# Patient Record
Sex: FEMALE | Race: WHITE | NOT HISPANIC OR LATINO | Employment: FULL TIME | URBAN - METROPOLITAN AREA
[De-identification: names, ages, dates, MRNs, and addresses within clinical notes are randomized per-mention and may not be internally consistent; named-entity substitution may affect disease eponyms.]

---

## 2017-02-04 ENCOUNTER — HOSPITAL ENCOUNTER (OUTPATIENT)
Facility: MEDICAL CENTER | Age: 47
End: 2017-02-06
Attending: SPECIALIST | Admitting: SPECIALIST
Payer: COMMERCIAL

## 2017-02-04 PROBLEM — N92.0 HEAVY PERIODS: Status: ACTIVE | Noted: 2017-02-04

## 2017-02-04 LAB
HCG UR QL: NEGATIVE
SP GR UR REFRACTOMETRY: 1.02

## 2017-02-04 PROCEDURE — 160029 HCHG SURGERY MINUTES - 1ST 30 MINS LEVEL 4: Performed by: SPECIALIST

## 2017-02-04 PROCEDURE — 500448 HCHG DRESSING, TELFA 3X4: Performed by: SPECIALIST

## 2017-02-04 PROCEDURE — 160035 HCHG PACU - 1ST 60 MINS PHASE I: Performed by: SPECIALIST

## 2017-02-04 PROCEDURE — 81025 URINE PREGNANCY TEST: CPT

## 2017-02-04 PROCEDURE — 501838 HCHG SUTURE GENERAL: Performed by: SPECIALIST

## 2017-02-04 PROCEDURE — 90686 IIV4 VACC NO PRSV 0.5 ML IM: CPT | Performed by: SPECIALIST

## 2017-02-04 PROCEDURE — A9270 NON-COVERED ITEM OR SERVICE: HCPCS | Performed by: SPECIALIST

## 2017-02-04 PROCEDURE — 160041 HCHG SURGERY MINUTES - EA ADDL 1 MIN LEVEL 4: Performed by: SPECIALIST

## 2017-02-04 PROCEDURE — 88305 TISSUE EXAM BY PATHOLOGIST: CPT

## 2017-02-04 PROCEDURE — A9270 NON-COVERED ITEM OR SERVICE: HCPCS

## 2017-02-04 PROCEDURE — 501666 HCHG TUBING, HYDRO THERMABLATOR: Performed by: SPECIALIST

## 2017-02-04 PROCEDURE — 160047 HCHG PACU  - EA ADDL 30 MINS PHASE II: Performed by: SPECIALIST

## 2017-02-04 PROCEDURE — 700102 HCHG RX REV CODE 250 W/ 637 OVERRIDE(OP)

## 2017-02-04 PROCEDURE — 500766 HCHG KIT, D & E COLLECTION: Performed by: SPECIALIST

## 2017-02-04 PROCEDURE — 700111 HCHG RX REV CODE 636 W/ 250 OVERRIDE (IP)

## 2017-02-04 PROCEDURE — 160002 HCHG RECOVERY MINUTES (STAT): Performed by: SPECIALIST

## 2017-02-04 PROCEDURE — 501667 HCHG TUBING, HYSTEROSCOPY: Performed by: SPECIALIST

## 2017-02-04 PROCEDURE — 501660 HCHG TUBING, D & E COLLECTION SET: Performed by: SPECIALIST

## 2017-02-04 PROCEDURE — 700101 HCHG RX REV CODE 250

## 2017-02-04 PROCEDURE — 502587 HCHG PACK, D&C: Performed by: SPECIALIST

## 2017-02-04 PROCEDURE — 700111 HCHG RX REV CODE 636 W/ 250 OVERRIDE (IP): Performed by: SPECIALIST

## 2017-02-04 PROCEDURE — 160046 HCHG PACU - 1ST 60 MINS PHASE II: Performed by: SPECIALIST

## 2017-02-04 PROCEDURE — 160025 RECOVERY II MINUTES (STATS): Performed by: SPECIALIST

## 2017-02-04 PROCEDURE — 700102 HCHG RX REV CODE 250 W/ 637 OVERRIDE(OP): Performed by: SPECIALIST

## 2017-02-04 PROCEDURE — 160048 HCHG OR STATISTICAL LEVEL 1-5: Performed by: SPECIALIST

## 2017-02-04 PROCEDURE — 502240 HCHG MISC OR SUPPLY RC 0272: Performed by: SPECIALIST

## 2017-02-04 PROCEDURE — 110382 HCHG SHELL REV 271: Performed by: SPECIALIST

## 2017-02-04 PROCEDURE — G0378 HOSPITAL OBSERVATION PER HR: HCPCS

## 2017-02-04 PROCEDURE — 160036 HCHG PACU - EA ADDL 30 MINS PHASE I: Performed by: SPECIALIST

## 2017-02-04 PROCEDURE — A4606 OXYGEN PROBE USED W OXIMETER: HCPCS | Performed by: SPECIALIST

## 2017-02-04 PROCEDURE — 90471 IMMUNIZATION ADMIN: CPT

## 2017-02-04 PROCEDURE — 160009 HCHG ANES TIME/MIN: Performed by: SPECIALIST

## 2017-02-04 RX ORDER — ONDANSETRON 2 MG/ML
INJECTION INTRAMUSCULAR; INTRAVENOUS
Status: COMPLETED
Start: 2017-02-04 | End: 2017-02-04

## 2017-02-04 RX ORDER — IBUPROFEN 800 MG/1
800 TABLET ORAL EVERY 6 HOURS PRN
Qty: 30 TAB | Refills: 0 | Status: SHIPPED | OUTPATIENT
Start: 2017-02-04 | End: 2017-02-27

## 2017-02-04 RX ORDER — MIDAZOLAM HYDROCHLORIDE 1 MG/ML
INJECTION INTRAMUSCULAR; INTRAVENOUS
Status: COMPLETED
Start: 2017-02-04 | End: 2017-02-04

## 2017-02-04 RX ORDER — OXYCODONE HCL 5 MG/5 ML
SOLUTION, ORAL ORAL
Status: COMPLETED
Start: 2017-02-04 | End: 2017-02-04

## 2017-02-04 RX ORDER — ONDANSETRON 2 MG/ML
4 INJECTION INTRAMUSCULAR; INTRAVENOUS EVERY 6 HOURS PRN
Status: DISCONTINUED | OUTPATIENT
Start: 2017-02-04 | End: 2017-02-06 | Stop reason: HOSPADM

## 2017-02-04 RX ORDER — OXYCODONE HYDROCHLORIDE AND ACETAMINOPHEN 5; 325 MG/1; MG/1
1-2 TABLET ORAL EVERY 4 HOURS PRN
Qty: 30 TAB | Refills: 0 | Status: SHIPPED | OUTPATIENT
Start: 2017-02-04 | End: 2019-09-14

## 2017-02-04 RX ORDER — OXYCODONE HYDROCHLORIDE AND ACETAMINOPHEN 5; 325 MG/1; MG/1
1-2 TABLET ORAL EVERY 4 HOURS PRN
Status: DISCONTINUED | OUTPATIENT
Start: 2017-02-04 | End: 2017-02-04

## 2017-02-04 RX ORDER — SODIUM CHLORIDE, SODIUM LACTATE, POTASSIUM CHLORIDE, CALCIUM CHLORIDE 600; 310; 30; 20 MG/100ML; MG/100ML; MG/100ML; MG/100ML
1000 INJECTION, SOLUTION INTRAVENOUS
Status: COMPLETED | OUTPATIENT
Start: 2017-02-04 | End: 2017-02-04

## 2017-02-04 RX ORDER — IBUPROFEN 800 MG/1
800 TABLET ORAL EVERY 6 HOURS PRN
Status: DISCONTINUED | OUTPATIENT
Start: 2017-02-04 | End: 2017-02-06 | Stop reason: HOSPADM

## 2017-02-04 RX ADMIN — FENTANYL CITRATE 25 MCG: 50 INJECTION, SOLUTION INTRAMUSCULAR; INTRAVENOUS at 16:50

## 2017-02-04 RX ADMIN — INFLUENZA A VIRUS A/CALIFORNIA/7/2009 X-179A (H1N1) ANTIGEN (FORMALDEHYDE INACTIVATED), INFLUENZA A VIRUS A/HONG KONG/4801/2014 X-263B (H3N2) ANTIGEN (FORMALDEHYDE INACTIVATED), INFLUENZA B VIRUS B/PHUKET/3073/2013 ANTIGEN (FORMALDEHYDE INACTIVATED), AND INFLUENZA B VIRUS B/BRISBANE/60/2008 ANTIGEN (FORMALDEHYDE INACTIVATED) 0.5 ML: 15; 15; 15; 15 INJECTION, SUSPENSION INTRAMUSCULAR at 20:06

## 2017-02-04 RX ADMIN — FENTANYL CITRATE 25 MCG: 50 INJECTION, SOLUTION INTRAMUSCULAR; INTRAVENOUS at 14:45

## 2017-02-04 RX ADMIN — IBUPROFEN 800 MG: 200 TABLET, FILM COATED ORAL at 15:08

## 2017-02-04 RX ADMIN — MORPHINE SULFATE: 50 INJECTION, SOLUTION, CONCENTRATE INTRAVENOUS at 18:15

## 2017-02-04 RX ADMIN — MIDAZOLAM 1 MG: 1 INJECTION INTRAMUSCULAR; INTRAVENOUS at 15:15

## 2017-02-04 RX ADMIN — MIDAZOLAM 1 MG: 1 INJECTION INTRAMUSCULAR; INTRAVENOUS at 14:20

## 2017-02-04 RX ADMIN — OXYCODONE HYDROCHLORIDE 10 MG: 5 SOLUTION ORAL at 14:15

## 2017-02-04 RX ADMIN — ONDANSETRON 4 MG: 2 INJECTION, SOLUTION INTRAMUSCULAR; INTRAVENOUS at 16:35

## 2017-02-04 RX ADMIN — FENTANYL CITRATE 25 MCG: 50 INJECTION, SOLUTION INTRAMUSCULAR; INTRAVENOUS at 14:15

## 2017-02-04 RX ADMIN — SODIUM CHLORIDE, SODIUM LACTATE, POTASSIUM CHLORIDE, CALCIUM CHLORIDE 1000 ML: 600; 310; 30; 20 INJECTION, SOLUTION INTRAVENOUS at 10:38

## 2017-02-04 RX ADMIN — FENTANYL CITRATE 25 MCG: 50 INJECTION, SOLUTION INTRAMUSCULAR; INTRAVENOUS at 14:20

## 2017-02-04 ASSESSMENT — PAIN SCALES - GENERAL
PAINLEVEL_OUTOF10: 6
PAINLEVEL_OUTOF10: 5
PAINLEVEL_OUTOF10: 4
PAINLEVEL_OUTOF10: 7
PAINLEVEL_OUTOF10: 6
PAINLEVEL_OUTOF10: 6
PAINLEVEL_OUTOF10: 5
PAINLEVEL_OUTOF10: 7
PAINLEVEL_OUTOF10: 0
PAINLEVEL_OUTOF10: 5

## 2017-02-04 ASSESSMENT — LIFESTYLE VARIABLES
ALCOHOL_USE: NO
EVER_SMOKED: YES

## 2017-02-04 NOTE — H&P
DATE OF SURGERY:  Saturday, 02/04/2017.    IDENTIFICATION:  The patient is a very pleasant 46-year-old multipara (para 2,   with 2 previous vaginal deliveries).    CHIEF COMPLAINT:  Menorrhagia.    HISTORY OF PRESENT ILLNESS:  The patient is a very pleasant 46-year-old   multipara with 2 previous vaginal deliveries, who had a laparoscopic bilateral   tubal ligation at [facility] and she has had   menorrhagia.  She complains also of worsening dysmenorrhea.  She says that her   menses are regular with interval of about 28-29 days and that the amount of   blood flow that she experiences with her menses is very heavy and she says she   always has dysmenorrhea and that over the past year, the dysmenorrhea has   become severe.  She smokes about 1 pack of cigarettes per day.  She says that   during the first 3 days of her menses, her menses are characterized by   extremely heavy blood flow somewhat so that she cannot even leave her house   during the first 3 days of her menses and that she sometimes goes through a   super plus absorbent tampon as frequently as every 20 minutes.  She also has   dysmenorrhea.  She is scheduled to have a hysteroscopy, D and C, and   hydrothermal endometrial ablation.  I have discussed with her in detail at   length with hysteroscopy, D and C, and hydrothermal endometrial ablation is   and what hysteroscopy, D and C, and hydrothermal endometrial ablation involves   and I have discussed with her and explained to her the risks and benefits and   alternatives of hysteroscopy, D and C, and hydrothermal endometrial ablation.    After our discussions and after answering her questions, she said that she   very much wishes for us to proceed with hysteroscopy, D and C, and   hydrothermal endometrial ablation.  Of note, her Pap smear which was collected   on 10/27/2016 came back according to the report from [email or web address] as   satisfactory for evaluation and negative for any  intraepithelial lesion or   malignancy.    PAST MEDICAL HISTORY:  The patient says she has a history urolithiasis.    PAST SURGICAL HISTORY:  The patient has had laparoscopic bilateral tubal   ligation.  She had a left breast lumpectomy.  She had an adenoidectomy when   she was 7 years old.    MEDICATIONS:  The patient says she recently switched from Zoloft to   Trintellix.    ALLERGIES:  No known drug allergies.    SOCIAL HISTORY:  The patient says that she smokes about 1 pack of cigarettes   per day.  She says she drinks a few alcoholic beverages per day.  She denies   the use of recreational drugs.    REVIEW OF SYSTEMS:  GENERAL:  No fevers or chills or sweats.  PULMONARY:  No coughing or wheezing or chest pain or shortness of breath.  CARDIOVASCULAR:  No palpitations or dyspnea or chest pain.  GASTROINTESTINAL:  No nausea, vomiting, diarrhea, constipation.  GENITOURINARY:  The patient complains of menorrhagia and dysmenorrhea as   described above.  MUSCULOSKELETAL:  No arthralgias or myalgias.  NEUROLOGIC:  No headaches or syncope or seizures.    PHYSICAL EXAMINATION:  VITAL SIGNS:  Patient's vital signs are stable and she is afebrile.  GENERAL:  The patient appears well-developed and well-nourished and relaxed   and alert and comfortable and in no apparent distress.  HEENT:  Normocephalic, atraumatic.  Pupils equal, round, reactive to light and   accommodation.  Extraocular motions intact.  Pharynx clear.  NECK:  There is no thyromegaly.  There is no cervical lymphadenopathy.  CHEST AND HEART:  Regular rate and rhythm.  No murmur.  LUNGS:  Clear to auscultation bilaterally.  ABDOMEN:  Examination of the patient's abdomen with the patient in the dorsal   supine position reveals that the abdomen is soft and flat, and nontender and   nondistended and that there is no hepatomegaly and that there is no   splenomegaly.  SPECULUM EXAM:  With the patient in the dorsal lithotomy position, a speculum   exam is performed  and reveals that there are no vulvar, vaginal or cervical   lesions and that there is no cervical or vaginal discharge and the vulva and   vaginal mucosa appear to be well estrogenized.  BIMANUAL EXAM:  With the patient in the dorsal lithotomy position, a bimanual   vaginal exam was performed and bimanual vaginal exam revealed no evidence of   uterine enlargement and no evidence of tenderness to palpation of the uterine   corpus and no evidence of any adnexal masses or tenderness either on the right   or the left.  EXTREMITIES:  No clubbing, cyanosis or edema.  NEUROLOGICAL:  Nonfocal.    ASSESSMENT:  1.  Menorrhagia.  2.  Dysmenorrhea.  3.  The patient smokes.    PLAN:  We will proceed with hysteroscopy, D and C, and hydrothermal   endometrial ablation.  As noted, I have discussed with the patient in detail   at length what hysteroscopy, D and C, and hydrothermal endometrial ablation is   and what hysteroscopy, D and C, and hydrothermal endometrial ablation   involves and I have discussed with her the risks and benefits and alternatives   of hysteroscopy, D and C, and hydrothermal endometrial ablation and after our   discussions and after answering her questions, she said that she very much   wishes for us to proceed with hysteroscopy, D and C, and hydrothermal   endometrial ablation.       ____________________________________     DANNY MUNOZ MD    MED / NTS    DD:  02/03/2017 15:35:26  DT:  02/03/2017 18:53:25    D#:  [id number]  Job#:  [id number]

## 2017-02-04 NOTE — OR SURGEON
Immediate Post-Operative Note      PreOp Diagnosis:   1.  Menorrhagia.  2.  Dysmenorrhea.  3.  The patient smokes    PostOp Diagnosis:   1.  Menorrhagia.  2.  Dysmenorrhea.  3.  The patient smokes    Procedure(s):  HYSTEROSCOPY WITH VIDEO DIAGNOSTIC - Wound Class: Clean Contaminated  DILATION AND CURETTAGE - Wound Class: Clean Contaminated  HYSTEROSCOPY THERMAL ABLATION - Wound Class: Clean Contaminated    Surgeon(s):  Ghulam Kirkland M.D.    Anesthesiologist/Type of Anesthesia:  Anesthesiologist: Saul Kat M.D./General    Surgical Staff:  Circulator: Ap Pettit R.N.; Licha Bro R.N.  Scrub Person: Riley Fonseca    Specimen:   Endometrial curetting    Estimated Blood Loss:   Less than 30 cc's     Findings:   At hysteroscopy no evidence of any congenital uterine anomaly is seen and no evidence of submucosal fibroids is seen.   Some evidence of endometrial polyps is seen at hysteroscopy.   When hysteroscopy is continued following endometrial ablation the entire intrauterine cavity is found to be nicely and thoroughly ablated.     Complications:   None.         2/4/2017 2:09 PM Ghulam Kirkland

## 2017-02-04 NOTE — IP AVS SNAPSHOT
" Home Care Instructions                                                                                                                  Name:Werner Wadsworth  Medical Record Number:[id number]  CSN: [id number]    YOB: 1970   Age: 46 y.o.  Sex: female  HT:1.702 m (5' 7.01\") WT: 81.5 kg (179 lb 10.8 oz)          Admit Date: 2/4/2017     Discharge Date:   Today's Date: 2/6/2017  Attending Doctor:  Ghulam Kirkland M.D.                  Allergies:  Review of patient's allergies indicates no known allergies.            Discharge Instructions         ACTIVITY: Rest and take it easy for the first 24 hours.  A responsible adult is recommended to remain with you during that time.  It is normal to feel sleepy.  We encourage you to not do anything that requires balance, judgment or coordination.    MILD FLU-LIKE SYMPTOMS ARE NORMAL. YOU MAY EXPERIENCE GENERALIZED MUSCLE ACHES, THROAT IRRITATION, HEADACHE AND/OR SOME NAUSEA.    FOR 24 HOURS DO NOT:  Drive, operate machinery or run household appliances.  Drink beer or alcoholic beverages.   Make important decisions or sign legal documents.    SPECIAL INSTRUCTIONS:     DIET: To avoid nausea, slowly advance diet as tolerated, avoiding spicy or greasy foods for the first day.  Add more substantial food to your diet according to your physician's instructions.  Babies can be fed formula or breast milk as soon as they are hungry.  INCREASE FLUIDS AND FIBER TO AVOID CONSTIPATION.    SURGICAL DRESSING/BATHING:     FOLLOW-UP APPOINTMENT:  A follow-up appointment should be arranged with your doctor in 2 weeks; call to schedule.    You should CALL YOUR PHYSICIAN if you develop:  Fever greater than 101 degrees F.  Pain not relieved by medication, or persistent nausea or vomiting.  Excessive bleeding (blood soaking through dressing) or unexpected drainage from the wound.  Extreme redness or swelling around the incision site, drainage of pus or foul smelling " drainage.  Inability to urinate or empty your bladder within 8 hours.  Problems with breathing or chest pain.    You should call 911 if you develop problems with breathing or chest pain.  If you are unable to contact your doctor or surgical center, you should go to the nearest emergency room or urgent care center.  Physician's telephone #: Dr. Kirkland    If any questions arise, call your doctor.  If your doctor is not available, please feel free to call the Surgical Center at [phone].  The Center is open Monday through Friday from 7AM to 7PM.  You can also call the [employer] HOTLINE open 24 hours/day, 7 days/week and speak to a nurse at [phone], or toll free at [phone].    A registered nurse may call you a few days after your surgery to see how you are doing after your procedure.    MEDICATIONS: Resume taking daily medication.  Take prescribed pain medication with food.  If no medication is prescribed, you may take non-aspirin pain medication if needed.  PAIN MEDICATION CAN BE VERY CONSTIPATING.  Take a stool softener or laxative such as senokot, pericolace, or milk of magnesia if needed.    Prescription given for pain.  Last pain medication given at 1300.    If your physician has prescribed pain medication that includes Acetaminophen (Tylenol), do not take additional Acetaminophen (Tylenol) while taking the prescribed medication.    Discharge Instructions    Discharged to home by car with relative. Discharged via walking, hospital escort: Refused.  Special equipment needed: Not Applicable    Be sure to schedule a follow-up appointment with your primary care doctor or any specialists as instructed.     Discharge Plan:   Smoking Cessation Offered: Patient Refused  Influenza Vaccine Indication: Indicated: 9 to 64 years of age  Influenza Vaccine Given - only chart on this line when given: Influenza Vaccine Given (See MAR)    I understand that a diet low in cholesterol, fat, and sodium is recommended for  good health. Unless I have been given specific instructions below for another diet, I accept this instruction as my diet prescription.   Other diet: Regular    Special Instructions: None    · Is patient discharged on Warfarin / Coumadin?   No     · Is patient Post Blood Transfusion?  No    Depression / Suicide Risk    As you are discharged from this [facility], it is important to learn how to keep safe from harming yourself.    Recognize the warning signs:  · Abrupt changes in personality, positive or negative- including increase in energy   · Giving away possessions  · Change in eating patterns- significant weight changes-  positive or negative  · Change in sleeping patterns- unable to sleep or sleeping all the time   · Unwillingness or inability to communicate  · Depression  · Unusual sadness, discouragement and loneliness  · Talk of wanting to die  · Neglect of personal appearance   · Rebelliousness- reckless behavior  · Withdrawal from people/activities they love  · Confusion- inability to concentrate     If you or a loved one observes any of these behaviors or has concerns about self-harm, here's what you can do:  · Talk about it- your feelings and reasons for harming yourself  · Remove any means that you might use to hurt yourself (examples: pills, rope, extension cords, firearm)  · Get professional help from the community (Mental Health, Substance Abuse, psychological counseling)  · Do not be alone:Call your Safe Contact- someone whom you trust who will be there for you.  · Call your local CRISIS HOTLINE 081-7588 or 220-061-6026  · Call your local Children's Mobile Crisis Response Team [place] (526) 696-3532 or www.[employer]  · Call the toll free National Suicide Prevention Hotlines   · National Suicide Prevention Lifeline 030-470-CBKX (7335)  · National Hope Line Network 800-SUICIDE (689-2830)        Depression / Suicide Risk    As you are discharged from this [facility],  it is important to learn how to keep safe from harming yourself.    Recognize the warning signs:  · Abrupt changes in personality, positive or negative- including increase in energy   · Giving away possessions  · Change in eating patterns- significant weight changes-  positive or negative  · Change in sleeping patterns- unable to sleep or sleeping all the time   · Unwillingness or inability to communicate  · Depression  · Unusual sadness, discouragement and loneliness  · Talk of wanting to die  · Neglect of personal appearance   · Rebelliousness- reckless behavior  · Withdrawal from people/activities they love  · Confusion- inability to concentrate     If you or a loved one observes any of these behaviors or has concerns about self-harm, here's what you can do:  · Talk about it- your feelings and reasons for harming yourself  · Remove any means that you might use to hurt yourself (examples: pills, rope, extension cords, firearm)  · Get professional help from the community (Mental Health, Substance Abuse, psychological counseling)  · Do not be alone:Call your Safe Contact- someone whom you trust who will be there for you.  · Call your local CRISIS HOTLINE 613-4856 or 683-272-9956  · Call your local Children's Mobile Crisis Response Team [place] (689) 773-9654 or www.[employer]  · Call the toll free National Suicide Prevention Hotlines   · National Suicide Prevention Lifeline 890-342-TMXL (6704)  · National Hope Line Network 800-SUICIDE (346-0695)    Follow-up Information     1. Follow up with Ghulam Kirkland M.D.. Schedule an appointment as soon as possible for a visit in 2 weeks.    Specialty:  OB/Gyn    Why:  post op follow up    Contact information    [facility]  [place] [address]  [phone]           Discharge Medication Instructions:    Below are the medications your physician expects you to take upon discharge:    Review all your home medications and newly ordered medications with your doctor  and/or pharmacist. Follow medication instructions as directed by your doctor and/or pharmacist.    Please keep your medication list with you and share with your physician.               Medication List      START taking these medications        Instructions    ibuprofen 800 MG Tabs   Last time this was given:  800 mg on 2/4/2017  3:08 PM   Commonly known as:  MOTRIN    Take 1 Tab by mouth every 6 hours as needed.   Dose:  800 mg       oxycodone-acetaminophen 5-325 MG Tabs   Last time this was given:  2 Tabs on 2/6/2017 12:54 PM   Commonly known as:  PERCOCET    Take 1-2 Tabs by mouth every four hours as needed for Moderate Pain.   Dose:  1-2 Tab         CONTINUE taking these medications        Instructions    TRINTELLIX 10 MG Tabs   Generic drug:  Vortioxetine HBr    Take  by mouth every day at 6 PM.               Instructions           Diet / Nutrition:    Follow any diet instructions given to you by your doctor or the dietician, including how much salt (sodium) you are allowed each day.    If you are overweight, talk to your doctor about a weight reduction plan.    Activity:    Remain physically active following your doctor's instructions about exercise and activity.    Rest often.     Any time you become even a little tired or short of breath, SIT DOWN and rest.    Worsening Symptoms:    Report any of the following signs and symptoms to the doctor's office immediately:    *Pain of jaw, arm, or neck  *Chest pain not relieved by medication                               *Dizziness or loss of consciousness  *Difficulty breathing even when at rest   *More tired than usual                                       *Bleeding drainage or swelling of surgical site  *Swelling of feet, ankles, legs or stomach                 *Fever (>100ºF)  *Pink or blood tinged sputum  *Weight gain (3lbs/day or 5lbs /week)           *Shock from internal defibrillator (if applicable)  *Palpitations or irregular heartbeats                *Cool  and/or numb extremities    Stroke Awareness    Common Risk Factors for Stroke include:    Age  Atrial Fibrillation  Carotid Artery Stenosis  Diabetes Mellitus  Excessive alcohol consumption  High blood pressure  Overweight   Physical inactivity  Smoking    Warning signs and symptoms of a stroke include:    *Sudden numbness or weakness of the face, arm or leg (especially on one side of the body).  *Sudden confusion, trouble speaking or understanding.  *Sudden trouble seeing in one or both eyes.  *Sudden trouble walking, dizziness, loss of balance or coordination.Sudden severe headache with no known cause.    It is very important to get treatment quickly when a stroke occurs. If you experience any of the above warning signs, call 911 immediately.                   Disclaimer         Quit Smoking / Tobacco Use:    I understand the use of any tobacco products increases my chance of suffering from future heart disease or stroke and could cause other illnesses which may shorten my life. Quitting the use of tobacco products is the single most important thing I can do to improve my health. For further information on smoking / tobacco cessation call a Toll Free Quit Line at 1-808.146.9162 (*National Cancer [place]) or 1-755.865.4033 (American Lung Association) or you can access the web based program at www.lungAvolent.org.    [place] Tobacco Users Help Line:  (735) 169-8954       Toll Free: 1-893.418.6879  Quit Tobacco Program [facility] (228)012-7903    Crisis Hotline:    [place] Crisis Hotline:  9-986-VOGPVCT or 1-375.414.4429    [place] Crisis Hotline:    1-624.286.5180 or 691-646-4719    Discharge Survey:   Thank you for choosing [facility]. We hope we did everything we could to make your hospital stay a pleasant one. You may be receiving a phone survey and we would appreciate your time and participation in answering the questions. Your input is very valuable to us in our efforts to improve our  service to our patients and their families.        My signature on this form indicates that:    1. I have reviewed and understand the above information.  2. My questions regarding this information have been answered to my satisfaction.  3. I have formulated a plan with my discharge nurse to obtain my prescribed medications for home.                  Disclaimer         __________________________________                     __________       ________                       Patient Signature                                                 Date                    Time

## 2017-02-05 PROCEDURE — G0378 HOSPITAL OBSERVATION PER HR: HCPCS

## 2017-02-05 PROCEDURE — 96361 HYDRATE IV INFUSION ADD-ON: CPT

## 2017-02-05 PROCEDURE — 700111 HCHG RX REV CODE 636 W/ 250 OVERRIDE (IP): Performed by: SPECIALIST

## 2017-02-05 RX ADMIN — MORPHINE SULFATE 50 MG: 50 INJECTION, SOLUTION, CONCENTRATE INTRAVENOUS at 11:45

## 2017-02-05 RX ADMIN — ONDANSETRON 4 MG: 2 INJECTION, SOLUTION INTRAMUSCULAR; INTRAVENOUS at 02:13

## 2017-02-05 ASSESSMENT — PAIN SCALES - GENERAL
PAINLEVEL_OUTOF10: 2
PAINLEVEL_OUTOF10: 3
PAINLEVEL_OUTOF10: 4

## 2017-02-05 ASSESSMENT — LIFESTYLE VARIABLES: DO YOU DRINK ALCOHOL: NO

## 2017-02-05 NOTE — PROGRESS NOTES
Pt arrived to unit via gurney from main ER at 1849.  Pt oriented to unit, call light.  Assessment completed, pt reports 7/10 abdominal pain.  POC updated. Call light within reach.  Needs met, will continue to monitor

## 2017-02-05 NOTE — PROGRESS NOTES
Pt ambulated to restroom with steady gait.  Provided pt with towels, wash cloths, soap per pt's request.

## 2017-02-05 NOTE — OR NURSING
0608: Called and gave report to ALLI Bowers for T205   No questions at this time.    Robinson will call to let us know when the room is clean and available.

## 2017-02-05 NOTE — OP REPORT
DATE OF SERVICE:  02/04/2017    PREOPERATIVE DIAGNOSES:  Menorrhagia, dysmenorrhea, and the patient smokes.    POSTOPERATIVE DIAGNOSES:  Menorrhagia, dysmenorrhea, and the patient smokes.    PROCEDURES PERFORMED:  Hysteroscopy, D and C, and hysteroscopic hydrothermal   endometrial ablation.    SURGEON:  Ghulam Kirkland MD    ANESTHESIA:  General anesthesia.    ANESTHESIOLOGIST:  Saul Kat MD    FINDINGS:  Speculum exam under anesthesia reveals that there are no vulvar or   vaginal or cervical lesions.  The cervix is well visualized and is large and   multiparous.  At hysteroscopy, no evidence of any congenital uterine anomaly   is seen and no evidence of submucosal fibroids is seen.  Some evidence of   endometrial polyps is seen at hysteroscopy.  When hysteroscopy is continued   following hydrothermal endometrial ablation, it is noted at hysteroscopy the   entire intrauterine cavity is nicely and thoroughly ablated.    SPECIMENS:  Endometrial curettings.    COMPLICATIONS:  None.    ESTIMATED BLOOD LOSS:  Less than 30 mL.    DESCRIPTION OF PROCEDURE:  After appropriate consents have been obtained, the   patient was taken to the operating room and given general anesthesia.  She was   prepped and draped in the dorsal lithotomy position and a Chiang catheter was   noted to be in place and draining urine.  A speculum exam was performed that   reveals that there are no vulvar or vaginal or cervical lesions.  The cervix   is large and multiparous and well visualized.  The anterior aspect of the   cervix was grasped with a single tooth tenaculum.  The cervix was dilated with   Delia and then Anju dilators.  The hysteroscope was advanced through the   endocervical canal into the intrauterine cavity and hysteroscopy was performed   and pictures were taken.  No evidence of any congenital uterine anomaly is   seen and no evidence of submucosal fibroids is seen.  Some evidence of   endometrial polyps is seen.  The hysteroscope  was removed.  A sharp curet is   introduced into the intrauterine cavity and all 4 quadrants of the   intrauterine cavity were thoroughly curetted and curettings were submitted on   a Telfa pad.  The hysteroscope was then reinserted through the endocervical   canal into the intrauterine cavity.  Hysteroscopy is repeated.  Then,   hydrothermal endometrial ablation is performed.  After the usual 90-second   warmup, hydrothermal endometrial ablation was continued for 10 minutes.  Then,   after the following 90-second cool down, hysteroscopy is continued and   pictures were taken and the entire intrauterine cavity was found to be nicely   and thoroughly cauterized.  Pictures are taken.  The hysteroscope was removed.    The single tooth tenaculum was removed from the cervix.  The cervix was   examined and no significant bleeding was seen coming from the cervix, either   from the site of attachment of the single tooth tenaculum or from the external   cervical os.  The speculum was then removed.  The procedure was terminated.    The patient tolerated the procedure well and sent to postanesthesia recovery   in stable condition.       ____________________________________     DANNY MUNOZ MD    MED / NTS    DD:  02/04/2017 14:40:29  DT:  02/04/2017 19:05:31    D#:  [id number]  Job#:  [id number]    cc: PETEY ACOSTA MD

## 2017-02-05 NOTE — OR NURSING
Up to bathroom, voided.  C/O nausea and vomited 200cc clear fluid.  States she still feels nauseated.

## 2017-02-05 NOTE — PROGRESS NOTES
POST OP DAY # 1  The patient says that she still has some pelvic cramping pain. She says that this pain is less this morning compared to yesterday.   She also has had some nausea, but not today.  She says that she feels fine otherwise.   She says that she has been ambulating and urinating and tolerating oral fluids.   The patient's vital signs have been stable and she has been afebrile.   She appears well developed and well nourished and relaxed and alert and comfortable and in no apparent distress.   We will continue to have the patient ambulate and will continue to give analgesia (morphine pca).   Ghulam Kirkland M.D.

## 2017-02-05 NOTE — PROGRESS NOTES
Assumed care of pt, pt A&OX4, HOLDEN, no n/v, tender on abd, PCA running per MAR, confirmed w/ day shift RN, 1mg/15min/30mg/4h, pt utilized PCA for pain, ICE on abd for comfort, up to bathroom, slight bleeding present from vaginal area. steady on foot,  in placed, flu vaccines adm per protocol. Provided ice water, pt tolerated ok, no c/o n/v,  2x rails up, call light within reach, hourly rounding in use.

## 2017-02-05 NOTE — PROGRESS NOTES
Pt got up for bathroom, pt feels nauseating, adm antinausea. All needs are met, hourly rounding in use.

## 2017-02-05 NOTE — PROGRESS NOTES
Pt got up to bathroom, ambulated good, pt c/o n/v x2  after each ambulation, will update MD james.

## 2017-02-05 NOTE — PROGRESS NOTES
Assumed patient care. Report received from ALIL Hernandez.  Pt AAO.  Respirations even, unlabored on 2L O2.  Pt reports of 2/10 abdominal pain, PCA infusing per MAR.    Call light within reach.  Pt updated on POC, updated communication board.  Needs met, will continue to monitor.

## 2017-02-06 VITALS
HEIGHT: 67 IN | OXYGEN SATURATION: 96 % | HEART RATE: 76 BPM | DIASTOLIC BLOOD PRESSURE: 71 MMHG | TEMPERATURE: 98.3 F | SYSTOLIC BLOOD PRESSURE: 110 MMHG | RESPIRATION RATE: 16 BRPM | BODY MASS INDEX: 28.2 KG/M2 | WEIGHT: 179.68 LBS

## 2017-02-06 PROCEDURE — 302129 PCA PLUS: Performed by: SPECIALIST

## 2017-02-06 PROCEDURE — G0378 HOSPITAL OBSERVATION PER HR: HCPCS

## 2017-02-06 PROCEDURE — 700102 HCHG RX REV CODE 250 W/ 637 OVERRIDE(OP): Performed by: SPECIALIST

## 2017-02-06 PROCEDURE — 700111 HCHG RX REV CODE 636 W/ 250 OVERRIDE (IP): Performed by: SPECIALIST

## 2017-02-06 PROCEDURE — A9270 NON-COVERED ITEM OR SERVICE: HCPCS | Performed by: SPECIALIST

## 2017-02-06 PROCEDURE — 96361 HYDRATE IV INFUSION ADD-ON: CPT

## 2017-02-06 RX ORDER — OXYCODONE HYDROCHLORIDE AND ACETAMINOPHEN 5; 325 MG/1; MG/1
1-2 TABLET ORAL EVERY 4 HOURS PRN
Status: DISCONTINUED | OUTPATIENT
Start: 2017-02-06 | End: 2017-02-06 | Stop reason: HOSPADM

## 2017-02-06 RX ADMIN — OXYCODONE HYDROCHLORIDE AND ACETAMINOPHEN 2 TABLET: 5; 325 TABLET ORAL at 12:54

## 2017-02-06 RX ADMIN — MORPHINE SULFATE 50 MG: 50 INJECTION, SOLUTION, CONCENTRATE INTRAVENOUS at 02:40

## 2017-02-06 ASSESSMENT — PAIN SCALES - GENERAL
PAINLEVEL_OUTOF10: 1

## 2017-02-06 ASSESSMENT — LIFESTYLE VARIABLES: DO YOU DRINK ALCOHOL: NO

## 2017-02-06 NOTE — PROGRESS NOTES
"Assumed patient care. Report received from ALLI Hernandez.  PCA infusing per MAR, verified rate with ALLI Hernandez.  Pt A&Ox4.  Respirations even, unlabored on room air.  Pt reports abdominal pain \"barely 1/10\".   Call light within reach.  Pt updated on POC, updated communication board.  Needs met, will continue to monitor.  Hot meal provided  "

## 2017-02-06 NOTE — DISCHARGE INSTRUCTIONS
ACTIVITY: Rest and take it easy for the first 24 hours.  A responsible adult is recommended to remain with you during that time.  It is normal to feel sleepy.  We encourage you to not do anything that requires balance, judgment or coordination.    MILD FLU-LIKE SYMPTOMS ARE NORMAL. YOU MAY EXPERIENCE GENERALIZED MUSCLE ACHES, THROAT IRRITATION, HEADACHE AND/OR SOME NAUSEA.    FOR 24 HOURS DO NOT:  Drive, operate machinery or run household appliances.  Drink beer or alcoholic beverages.   Make important decisions or sign legal documents.    SPECIAL INSTRUCTIONS:     DIET: To avoid nausea, slowly advance diet as tolerated, avoiding spicy or greasy foods for the first day.  Add more substantial food to your diet according to your physician's instructions.  Babies can be fed formula or breast milk as soon as they are hungry.  INCREASE FLUIDS AND FIBER TO AVOID CONSTIPATION.    SURGICAL DRESSING/BATHING:     FOLLOW-UP APPOINTMENT:  A follow-up appointment should be arranged with your doctor in 2 weeks; call to schedule.    You should CALL YOUR PHYSICIAN if you develop:  Fever greater than 101 degrees F.  Pain not relieved by medication, or persistent nausea or vomiting.  Excessive bleeding (blood soaking through dressing) or unexpected drainage from the wound.  Extreme redness or swelling around the incision site, drainage of pus or foul smelling drainage.  Inability to urinate or empty your bladder within 8 hours.  Problems with breathing or chest pain.    You should call 911 if you develop problems with breathing or chest pain.  If you are unable to contact your doctor or surgical center, you should go to the nearest emergency room or urgent care center.  Physician's telephone #: Dr. Kirkland    If any questions arise, call your doctor.  If your doctor is not available, please feel free to call the Surgical Center at [phone].  The Center is open Monday through Friday from 7AM to 7PM.  You can also call the HEALTH  HOTLINE open 24 hours/day, 7 days/week and speak to a nurse at [phone], or toll free at [phone].    A registered nurse may call you a few days after your surgery to see how you are doing after your procedure.    MEDICATIONS: Resume taking daily medication.  Take prescribed pain medication with food.  If no medication is prescribed, you may take non-aspirin pain medication if needed.  PAIN MEDICATION CAN BE VERY CONSTIPATING.  Take a stool softener or laxative such as senokot, pericolace, or milk of magnesia if needed.    Prescription given for pain.  Last pain medication given at 1300.    If your physician has prescribed pain medication that includes Acetaminophen (Tylenol), do not take additional Acetaminophen (Tylenol) while taking the prescribed medication.    Discharge Instructions    Discharged to home by car with relative. Discharged via walking, hospital escort: Refused.  Special equipment needed: Not Applicable    Be sure to schedule a follow-up appointment with your primary care doctor or any specialists as instructed.     Discharge Plan:   Smoking Cessation Offered: Patient Refused  Influenza Vaccine Indication: Indicated: 9 to 64 years of age  Influenza Vaccine Given - only chart on this line when given: Influenza Vaccine Given (See MAR)    I understand that a diet low in cholesterol, fat, and sodium is recommended for good health. Unless I have been given specific instructions below for another diet, I accept this instruction as my diet prescription.   Other diet: Regular    Special Instructions: None    · Is patient discharged on Warfarin / Coumadin?   No     · Is patient Post Blood Transfusion?  No    Depression / Suicide Risk    As you are discharged from this [facility] facility, it is important to learn how to keep safe from harming yourself.    Recognize the warning signs:  · Abrupt changes in personality, positive or negative- including increase in energy   · Giving away  possessions  · Change in eating patterns- significant weight changes-  positive or negative  · Change in sleeping patterns- unable to sleep or sleeping all the time   · Unwillingness or inability to communicate  · Depression  · Unusual sadness, discouragement and loneliness  · Talk of wanting to die  · Neglect of personal appearance   · Rebelliousness- reckless behavior  · Withdrawal from people/activities they love  · Confusion- inability to concentrate     If you or a loved one observes any of these behaviors or has concerns about self-harm, here's what you can do:  · Talk about it- your feelings and reasons for harming yourself  · Remove any means that you might use to hurt yourself (examples: pills, rope, extension cords, firearm)  · Get professional help from the community (Mental Health, Substance Abuse, psychological counseling)  · Do not be alone:Call your Safe Contact- someone whom you trust who will be there for you.  · Call your local CRISIS HOTLINE 699-2349 or 861-376-7238  · Call your local Children's Mobile Crisis Response Team [place] (203) 636-9314 or [employer]  · Call the toll free National Suicide Prevention Hotlines   · National Suicide Prevention Lifeline 410-795-DVVG (1640)  · National Hope Line Network 800-SUICIDE (677-8435)        Depression / Suicide Risk    As you are discharged from this [facility] facility, it is important to learn how to keep safe from harming yourself.    Recognize the warning signs:  · Abrupt changes in personality, positive or negative- including increase in energy   · Giving away possessions  · Change in eating patterns- significant weight changes-  positive or negative  · Change in sleeping patterns- unable to sleep or sleeping all the time   · Unwillingness or inability to communicate  · Depression  · Unusual sadness, discouragement and loneliness  · Talk of wanting to die  · Neglect of personal appearance   · Rebelliousness- reckless  behavior  · Withdrawal from people/activities they love  · Confusion- inability to concentrate     If you or a loved one observes any of these behaviors or has concerns about self-harm, here's what you can do:  · Talk about it- your feelings and reasons for harming yourself  · Remove any means that you might use to hurt yourself (examples: pills, rope, extension cords, firearm)  · Get professional help from the community (Mental Health, Substance Abuse, psychological counseling)  · Do not be alone:Call your Safe Contact- someone whom you trust who will be there for you.  · Call your local CRISIS HOTLINE 531-7031 or 851-703-5811  · Call your local Children's Mobile Crisis Response Team [place] (118) 200-2511 or www.[employer]  · Call the toll free National Suicide Prevention Hotlines   · National Suicide Prevention Lifeline 198-125-TPQF (2352)  · National Hope Line Network 800-SUICIDE (500-7308)

## 2017-02-06 NOTE — PROGRESS NOTES
POST OP DAY # 2  The patient says that she feels better today.   She says that she is ambulating and urinating and tolerating a regular diet.   She denies any nausea or vomiting.   She says that her pelvic cramping pain is much less.   The patient's vital signs are stable and she is afebrile.   She appears well developed and well nourished and relaxed and alert and comfortable and in no apparent distress.   We will discharge home later today.   Rx's for percocet and ibuprofen.   Follow up in office in about 2 weeks for a post op visit.   Ghulam Kirkland M.D.

## 2017-02-06 NOTE — PROGRESS NOTES
IV dc'd.  Discharge instructions given to patient; patient verbalizes understanding, all questions answered.  Copy of DC summary provided, signed copy in chart.  2 prescriptions provided to patient, copies in chart.  Pt states personal belongings are in possession.  Pt escorted off unit by unit clerk without incident.

## 2017-02-06 NOTE — PROGRESS NOTES
Assumed care of pt, pt A&OX4, HOLDEN, no n/v, tender on abd, PCA running per MAR, confirmed w/ day shift RN, basal 1mg/h, 1mg/15min/30mg/4h, pt utilized PCA for pain, ICE on abd for comfort, up to bathroom, steady on foot,  in placed, pt had Reg diet for dinner, pt tolerated ok, no c/o n/v,  2x rails up,  Pt tearful d/t family issue, provided emotional support as much, provided quiet and calm environment to increase comfort, call light within reach, hourly rounding in use.

## 2017-02-07 NOTE — DISCHARGE SUMMARY
ADMISSION DIAGNOSES:  1.  Menorrhagia.  2.  Dysmenorrhea.  3.  The patient smokes.    DISCHARGE DIAGNOSES:  1.  Menorrhagia.  2.  Dysmenorrhea.  3.  The patient smokes.  4.  Greater than average postoperative pain, which did resolve.    PROCEDURES:  Hysteroscopy, D and C, and hydrothermal endometrial ablation.    COMPLICATIONS:  None.    HOSPITAL COURSE:  The patient was admitted to [facility]   on Saturday, 02/04/2017 with the aforementioned admission diagnoses and   underwent hysteroscopy, D and C, and hydrothermal endometrial ablation.  The   procedure was without complications.  The patient's postoperative pain was   greater than average and required IV analgesia (morphine PCA) and so she could   not be sent home on the same day and in fact, could not be sent home the next   day, Sunday, 02/05/2017, postoperative day #1, but today, Monday,   02/06/20/17, postoperative day #2, the patient says that she is feeling much   better and that her pain is much less and she says she is ambulating and   urinating and tolerating regular diet and says she would like to go home   today.  We will plan on discharging her home later today with prescriptions   for Percocet and ibuprofen, and I asked her to follow up with me in the office   in about 2 weeks and to call or contact me at any time should she ever have   any problems or questions or complaints and she said that she would do so.       ____________________________________     MD FIDEL CEDILLO / NTS    DD:  02/06/2017 06:36:05  DT:  02/06/2017 21:52:12    D#:  [id number]  Job#:  [id number]

## 2017-02-27 ENCOUNTER — APPOINTMENT (OUTPATIENT)
Dept: RADIOLOGY | Facility: MEDICAL CENTER | Age: 47
End: 2017-02-27
Attending: EMERGENCY MEDICINE
Payer: COMMERCIAL

## 2017-02-27 ENCOUNTER — HOSPITAL ENCOUNTER (EMERGENCY)
Facility: MEDICAL CENTER | Age: 47
End: 2017-02-27
Attending: EMERGENCY MEDICINE
Payer: COMMERCIAL

## 2017-02-27 VITALS
WEIGHT: 170 LBS | RESPIRATION RATE: 18 BRPM | TEMPERATURE: 98.4 F | BODY MASS INDEX: 26.68 KG/M2 | OXYGEN SATURATION: 94 % | HEIGHT: 67 IN | DIASTOLIC BLOOD PRESSURE: 56 MMHG | HEART RATE: 92 BPM | SYSTOLIC BLOOD PRESSURE: 105 MMHG

## 2017-02-27 DIAGNOSIS — S01.01XA LACERATION OF SCALP WITHOUT FOREIGN BODY, INITIAL ENCOUNTER: ICD-10-CM
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
## 2017-02-27 LAB
BASOPHILS # BLD AUTO: 0.2 % (ref 0–1.8)
BASOPHILS # BLD: 0.03 K/UL (ref 0–0.12)
EOSINOPHIL # BLD AUTO: 0 K/UL (ref 0–0.51)
EOSINOPHIL NFR BLD: 0 % (ref 0–6.9)
ERYTHROCYTE [DISTWIDTH] IN BLOOD BY AUTOMATED COUNT: 43.4 FL (ref 35.9–50)
ETHANOL BLD-MCNC: 0.09 G/DL
HCG SERPL QL: NEGATIVE
HCT VFR BLD AUTO: 41.1 % (ref 37–47)
HGB BLD-MCNC: 14.1 G/DL (ref 12–16)
IMM GRANULOCYTES # BLD AUTO: 0.03 K/UL (ref 0–0.11)
IMM GRANULOCYTES NFR BLD AUTO: 0.2 % (ref 0–0.9)
LYMPHOCYTES # BLD AUTO: 1.51 K/UL (ref 1–4.8)
LYMPHOCYTES NFR BLD: 11.1 % (ref 22–41)
MCH RBC QN AUTO: 32.3 PG (ref 27–33)
MCHC RBC AUTO-ENTMCNC: 34.3 G/DL (ref 33.6–35)
MCV RBC AUTO: 94.1 FL (ref 81.4–97.8)
MONOCYTES # BLD AUTO: 0.82 K/UL (ref 0–0.85)
MONOCYTES NFR BLD AUTO: 6 % (ref 0–13.4)
NEUTROPHILS # BLD AUTO: 11.26 K/UL (ref 2–7.15)
NEUTROPHILS NFR BLD: 82.5 % (ref 44–72)
NRBC # BLD AUTO: 0 K/UL
NRBC BLD AUTO-RTO: 0 /100 WBC
PLATELET # BLD AUTO: 284 K/UL (ref 164–446)
PMV BLD AUTO: 9.9 FL (ref 9–12.9)
RBC # BLD AUTO: 4.37 M/UL (ref 4.2–5.4)
WBC # BLD AUTO: 13.7 K/UL (ref 4.8–10.8)

## 2017-02-27 PROCEDURE — 84703 CHORIONIC GONADOTROPIN ASSAY: CPT

## 2017-02-27 PROCEDURE — 99284 EMERGENCY DEPT VISIT MOD MDM: CPT

## 2017-02-27 PROCEDURE — 80307 DRUG TEST PRSMV CHEM ANLYZR: CPT

## 2017-02-27 PROCEDURE — 70486 CT MAXILLOFACIAL W/O DYE: CPT

## 2017-02-27 PROCEDURE — 304991 HCHG REPAIR-COMPLEX-LVL 1, ADD 5 CM

## 2017-02-27 PROCEDURE — 700111 HCHG RX REV CODE 636 W/ 250 OVERRIDE (IP): Performed by: EMERGENCY MEDICINE

## 2017-02-27 PROCEDURE — 700102 HCHG RX REV CODE 250 W/ 637 OVERRIDE(OP): Performed by: EMERGENCY MEDICINE

## 2017-02-27 PROCEDURE — 303747 HCHG EXTRA SUTURE

## 2017-02-27 PROCEDURE — 90715 TDAP VACCINE 7 YRS/> IM: CPT | Performed by: EMERGENCY MEDICINE

## 2017-02-27 PROCEDURE — 304217 HCHG IRRIGATION SYSTEM

## 2017-02-27 PROCEDURE — 72125 CT NECK SPINE W/O DYE: CPT

## 2017-02-27 PROCEDURE — 70450 CT HEAD/BRAIN W/O DYE: CPT

## 2017-02-27 PROCEDURE — 304992 HCHG REPAIR-COMPLEX-LVL 1,1.1-7.5CM

## 2017-02-27 PROCEDURE — 90471 IMMUNIZATION ADMIN: CPT

## 2017-02-27 PROCEDURE — 305308 HCHG STAPLER,SKIN,DISP.

## 2017-02-27 PROCEDURE — 303485 HCHG DRESSING MEDIUM

## 2017-02-27 PROCEDURE — 85025 COMPLETE CBC W/AUTO DIFF WBC: CPT

## 2017-02-27 PROCEDURE — A9270 NON-COVERED ITEM OR SERVICE: HCPCS | Performed by: EMERGENCY MEDICINE

## 2017-02-27 RX ORDER — IBUPROFEN 800 MG/1
800 TABLET ORAL EVERY 6 HOURS PRN
Status: SHIPPED | COMMUNITY
End: 2019-09-14

## 2017-02-27 RX ORDER — HYDROCODONE BITARTRATE AND ACETAMINOPHEN 5; 325 MG/1; MG/1
1 TABLET ORAL EVERY 4 HOURS PRN
Qty: 20 TAB | Refills: 0 | Status: SHIPPED | OUTPATIENT
Start: 2017-02-27 | End: 2017-03-05

## 2017-02-27 RX ORDER — IBUPROFEN 600 MG/1
600 TABLET ORAL ONCE
Status: COMPLETED | OUTPATIENT
Start: 2017-02-27 | End: 2017-02-27

## 2017-02-27 RX ORDER — HYDROCODONE BITARTRATE AND ACETAMINOPHEN 7.5; 325 MG/1; MG/1
1 TABLET ORAL ONCE
Status: COMPLETED | OUTPATIENT
Start: 2017-02-27 | End: 2017-02-27

## 2017-02-27 RX ORDER — CEPHALEXIN 500 MG/1
500 CAPSULE ORAL 4 TIMES DAILY
Qty: 20 CAP | Refills: 0 | Status: SHIPPED | OUTPATIENT
Start: 2017-02-27 | End: 2017-03-04

## 2017-02-27 RX ADMIN — HYDROCODONE BITARTRATE AND ACETAMINOPHEN 1 TABLET: 7.5; 325 TABLET ORAL at 20:31

## 2017-02-27 RX ADMIN — IBUPROFEN 600 MG: 600 TABLET, FILM COATED ORAL at 20:31

## 2017-02-27 RX ADMIN — CLOSTRIDIUM TETANI TOXOID ANTIGEN (FORMALDEHYDE INACTIVATED), CORYNEBACTERIUM DIPHTHERIAE TOXOID ANTIGEN (FORMALDEHYDE INACTIVATED), BORDETELLA PERTUSSIS TOXOID ANTIGEN (GLUTARALDEHYDE INACTIVATED), BORDETELLA PERTUSSIS FILAMENTOUS HEMAGGLUTININ ANTIGEN (FORMALDEHYDE INACTIVATED), BORDETELLA PERTUSSIS PERTACTIN ANTIGEN, AND BORDETELLA PERTUSSIS FIMBRIAE 2/3 ANTIGEN 0.5 ML: 5; 2; 2.5; 5; 3; 5 INJECTION, SUSPENSION INTRAMUSCULAR at 17:44

## 2017-02-27 ASSESSMENT — ENCOUNTER SYMPTOMS
BRUISES/BLEEDS EASILY: 0
DIZZINESS: 0
BACK PAIN: 0
FEVER: 0
PHOTOPHOBIA: 0
WOUND: 1
SHORTNESS OF BREATH: 0
VOMITING: 0
ARTHRALGIAS: 0
NAUSEA: 0
HEADACHES: 0

## 2017-02-27 ASSESSMENT — PAIN SCALES - GENERAL: PAINLEVEL_OUTOF10: 7

## 2017-02-27 NOTE — ED AVS SNAPSHOT
Home Care Instructions                                                                                                                Werner Wadsworth   MRN: [id number]    Department:  [facility], Emergency Dept   Date of Visit:  2/27/2017            [facility], Emergency Dept    [address]    [place] [address]    Phone:  [phone]      You were seen by     Lala Astudillo M.D.      Your Diagnosis Was     Laceration of scalp without foreign body, initial encounter     S01.01XA       These are the medications you received during your hospitalization from 02/27/2017 1649 to 02/27/2017 2043     Date/Time Order Dose Route Action    02/27/2017 1744 tetanus-dipth-acell pertussis (ADACEL) inj 0.5 mL 0.5 mL Intramuscular Given    02/27/2017 2031 hydrocodone-acetaminophen (NORCO) 7.5-325 MG per tablet 1 Tab 1 Tab Oral Given    02/27/2017 2031 ibuprofen (MOTRIN) tablet 600 mg 600 mg Oral Given      Follow-up Information     1. Follow up with [facility], Emergency Dept In 7 days.    Specialty:  Emergency Medicine    Why:  7-10 days for staple removal , For suture removal    Contact information    [address] [address] [phone]      Medication Information     Review all of your home medications and newly ordered medications with your primary doctor and/or pharmacist as soon as possible. Follow medication instructions as directed by your doctor and/or pharmacist.     Please keep your complete medication list with you and share with your physician. Update the information when medications are discontinued, doses are changed, or new medications (including over-the-counter products) are added; and carry medication information at all times in the event of emergency situations.               Medication List      START taking these medications        Instructions    cephALEXin 500 MG Caps   Commonly known as:   KEFLEX    Take 1 Cap by mouth 4 times a day for 5 days.   Dose:  500 mg       hydrocodone-acetaminophen 5-325 MG Tabs per tablet   Commonly known as:  NORCO    Take 1 Tab by mouth every four hours as needed for up to 6 days.   Dose:  1 Tab         ASK your doctor about these medications        Instructions    ibuprofen 800 MG Tabs   Commonly known as:  MOTRIN    Take 800 mg by mouth every 6 hours as needed for Moderate Pain.   Dose:  800 mg       oxycodone-acetaminophen 5-325 MG Tabs   Commonly known as:  PERCOCET    Take 1-2 Tabs by mouth every four hours as needed for Moderate Pain.   Dose:  1-2 Tab       TRINTELLIX 10 MG Tabs   Generic drug:  Vortioxetine HBr    Take 10 mg by mouth every bedtime.   Dose:  10 mg               Procedures and tests performed during your visit     BETA-HCG QUALITATIVE SERUM    CBC WITH DIFFERENTIAL    CT-CSPINE WITHOUT PLUS RECONS    CT-HEAD W/O    CT-MAXILLOFACIAL W/O    DIAGNOSTIC ALCOHOL        Discharge Instructions       Head Injury, Adult  You have a head injury. Headaches and throwing up (vomiting) are common after a head injury. It should be easy to wake up from sleeping. Sometimes you must stay in the hospital. Most problems happen within the first 24 hours. Side effects may occur up to 7-10 days after the injury.   WHAT ARE THE TYPES OF HEAD INJURIES?  Head injuries can be as minor as a bump. Some head injuries can be more severe. More severe head injuries include:  · A jarring injury to the brain (concussion).  · A bruise of the brain (contusion). This mean there is bleeding in the brain that can cause swelling.  · A cracked skull (skull fracture).  · Bleeding in the brain that collects, clots, and forms a bump (hematoma).  WHEN SHOULD I GET HELP RIGHT AWAY?   · You are confused or sleepy.  · You cannot be woken up.  · You feel sick to your stomach (nauseous) or keep throwing up (vomiting).  · Your dizziness or unsteadiness is getting worse.  · You have very bad, lasting  headaches that are not helped by medicine. Take medicines only as told by your doctor.  · You cannot use your arms or legs like normal.  · You cannot walk.  · You notice changes in the black spots in the center of the colored part of your eye (pupil).  · You have clear or bloody fluid coming from your nose or ears.  · You have trouble seeing.  During the next 24 hours after the injury, you must stay with someone who can watch you. This person should get help right away (call 911 in the [place] if you start to shake and are not able to control it (have seizures), you pass out, or you are unable to wake up.  HOW CAN I PREVENT A HEAD INJURY IN THE FUTURE?  · Wear seat belts.  · Wear a helmet while bike riding and playing sports like football.  · Stay away from dangerous activities around the house.  WHEN CAN I RETURN TO NORMAL ACTIVITIES AND ATHLETICS?  See your doctor before doing these activities. You should not do normal activities or play contact sports until 1 week after the following symptoms have stopped:  · Headache that does not go away.  · Dizziness.  · Poor attention.  · Confusion.  · Memory problems.  · Sickness to your stomach or throwing up.  · Tiredness.  · Fussiness.  · Bothered by bright lights or loud noises.  · Anxiousness or depression.  · Restless sleep.  MAKE SURE YOU:   · Understand these instructions.  · Will watch your condition.  · Will get help right away if you are not doing well or get worse.     This information is not intended to replace advice given to you by your health care provider. Make sure you discuss any questions you have with your health care provider.     Document Released: 11/30/2009 Document Revised: 01/08/2016 Document Reviewed: 08/25/2014  [employer] Interactive Patient Education ©2016 [employer]    Laceration Care, Adult  A laceration is a cut that goes through all of the layers of the skin and into the tissue that is right under the skin. Some lacerations heal on their  own. Others need to be closed with stitches (sutures), staples, skin adhesive strips, or skin glue. Proper laceration care minimizes the risk of infection and helps the laceration to heal better.  HOW TO CARE FOR YOUR LACERATION  If sutures or staples were used:  · Keep the wound clean and dry.  · If you were given a bandage (dressing), you should change it at least one time per day or as told by your health care provider. You should also change it if it becomes wet or dirty.  · Keep the wound completely dry for the first 24 hours or as told by your health care provider. After that time, you may shower or bathe. However, make sure that the wound is not soaked in water until after the sutures or staples have been removed.  · Clean the wound one time each day or as told by your health care provider:  · Wash the wound with soap and water.  · Rinse the wound with water to remove all soap.  · Pat the wound dry with a clean towel. Do not rub the wound.  · After cleaning the wound, apply a thin layer of antibiotic ointment as told by your health care provider. This will help to prevent infection and keep the dressing from sticking to the wound.  · Have the sutures or staples removed as told by your health care provider.  If skin adhesive strips were used:  · Keep the wound clean and dry.  · If you were given a bandage (dressing), you should change it at least one time per day or as told by your health care provider. You should also change it if it becomes dirty or wet.  · Do not get the skin adhesive strips wet. You may shower or bathe, but be careful to keep the wound dry.  · If the wound gets wet, pat it dry with a clean towel. Do not rub the wound.  · Skin adhesive strips fall off on their own. You may trim the strips as the wound heals. Do not remove skin adhesive strips that are still stuck to the wound. They will fall off in time.  If skin glue was used:  · Try to keep the wound dry, but you may briefly wet it in the  shower or bath. Do not soak the wound in water, such as by swimming.  · After you have showered or bathed, gently pat the wound dry with a clean towel. Do not rub the wound.  · Do not do any activities that will make you sweat heavily until the skin glue has fallen off on its own.  · Do not apply liquid, cream, or ointment medicine to the wound while the skin glue is in place. Using those may loosen the film before the wound has healed.  · If you were given a bandage (dressing), you should change it at least one time per day or as told by your health care provider. You should also change it if it becomes dirty or wet.  · If a dressing is placed over the wound, be careful not to apply tape directly over the skin glue. Doing that may cause the glue to be pulled off before the wound has healed.  · Do not pick at the glue. The skin glue usually remains in place for 5-10 days, then it falls off of the skin.  General Instructions  · Take over-the-counter and prescription medicines only as told by your health care provider.  · If you were prescribed an antibiotic medicine or ointment, take or apply it as told by your doctor. Do not stop using it even if your condition improves.  · To help prevent scarring, make sure to cover your wound with sunscreen whenever you are outside after stitches are removed, after adhesive strips are removed, or when glue remains in place and the wound is healed. Make sure to wear a sunscreen of at least 30 SPF.  · Do not scratch or pick at the wound.  · Keep all follow-up visits as told by your health care provider. This is important.  · Check your wound every day for signs of infection. Watch for:  ¨ Redness, swelling, or pain.  ¨ Fluid, blood, or pus.  · Raise (elevate) the injured area above the level of your heart while you are sitting or lying down, if possible.  SEEK MEDICAL CARE IF:  · You received a tetanus shot and you have swelling, severe pain, redness, or bleeding at the injection  site.  · You have a fever.  · A wound that was closed breaks open.  · You notice a bad smell coming from your wound or your dressing.  · You notice something coming out of the wound, such as wood or glass.  · Your pain is not controlled with medicine.  · You have increased redness, swelling, or pain at the site of your wound.  · You have fluid, blood, or pus coming from your wound.  · You notice a change in the color of your skin near your wound.  · You need to change the dressing frequently due to fluid, blood, or pus draining from the wound.  · You develop a new rash.  · You develop numbness around the wound.  SEEK IMMEDIATE MEDICAL CARE IF:  · You develop severe swelling around the wound.  · Your pain suddenly increases and is severe.  · You develop painful lumps near the wound or on skin that is anywhere on your body.  · You have a red streak going away from your wound.  · The wound is on your hand or foot and you cannot properly move a finger or toe.  · The wound is on your hand or foot and you notice that your fingers or toes look pale or bluish.     This information is not intended to replace advice given to you by your health care provider. Make sure you discuss any questions you have with your health care provider.     Document Released: 12/18/2006 Document Revised: 05/03/2016 Document Reviewed: 12/14/2015  [employer] Interactive Patient Education ©2016 [employer]    Staple Wound Closure  Staples are used to help a wound heal faster by holding the edges of the wound together.  HOME CARE  · Keep the area around the staples clean and dry.  · Rest and raise (elevate) the injured part above the level of your heart.  · See your doctor for a follow-up check of the wound.  · See your doctor to have the staples removed.  · Clean the wound daily with water.  · Do not soak the wound in water for long periods of time.  · Let air reach the wound as it heals.  GET HELP RIGHT AWAY IF:   · You have redness or puffiness  around the wound.  · You have a red line going away from the wound.  · You have more pain or tenderness.  · You have yellowish-white fluid (pus) coming from the wound.  · Your wound does not stay together after the staples have been taken out.  · You see something coming out of the wound, such as wood or glass.  · You have problems moving the injured area.  · You have a fever or lasting symptoms for more than 2-3 days.  · You have a fever and your symptoms suddenly get worse.  MAKE SURE YOU:   · Understand these instructions.  · Will watch this condition.  · Will get help right away if you are not doing well or get worse.  Document Released: 09/26/2009 Document Revised: 09/11/2013 Document Reviewed: 06/30/2013  ExitCare® Patient Information ©2014 [employer].            Patient Information     Patient Information    Following emergency treatment: all patient requiring follow-up care must return either to a private physician or a clinic if your condition worsens before you are able to obtain further medical attention, please return to the emergency room.     Billing Information    At [facility], we work to make the billing process streamlined for our patients.  Our Representatives are here to answer any questions you may have regarding your hospital bill.  If you have insurance coverage and have supplied your insurance information to us, we will submit a claim to your insurer on your behalf.  Should you have any questions regarding your bill, we can be reached online or by phone as follows:  Online: You are able pay your bills online or live chat with our representatives about any billing questions you may have. We are here to help Monday - Friday from 8:00am to 7:30pm and 9:00am - 12:00pm on Saturdays.  Please visit [email or web address]  for more information.   Phone:  [phone] or [phone]    Please note that your emergency physician, surgeon, pathologist, radiologist,  anesthesiologist, and other specialists are not employed by [facility] and will therefore bill separately for their services.  Please contact them directly for any questions concerning their bills at the numbers below:     Emergency Physician Services:  [phone]  [facility]:  [phone]  [facility]:  [phone]  [facility]:  [phone]    1. Your final bill may vary from the amount quoted upon discharge if all procedures are not complete at that time, or if your doctor has additional procedures of which we are not aware. You will receive an additional bill if you return to the Emergency Department at [facility] for suture removal regardless of the facility of which the sutures were placed.     2. Please arrange for settlement of this account at the emergency registration.    3. All self-pay accounts are due in full at the time of treatment.  If you are unable to meet this obligation then payment is expected within 4-5 days.     4. If you have had radiology studies (CT, X-ray, Ultrasound, MRI), you have received a preliminary result during your emergency department visit. Please contact the radiology department [phone] to receive a copy of your final result. Please discuss the Final result with your primary physician or with the follow up physician provided.     Crisis Hotline:  [place] Crisis Hotline:  4-388-SHMLENI or 1-117.157.5740  [place] Crisis Hotline:    1-273.372.4677 or 530-803-7773         ED Discharge Follow Up Questions    1. In order to provide you with very good care, we would like to follow up with a phone call in the next few days.  May we have your permission to contact you?     YES /  NO    2. What is the best phone number to call you? (       )_____-__________    3. What is the best time to call you?      Morning  /  Afternoon  /  Evening                   Patient Signature:   ____________________________________________________________    Date:  ____________________________________________________________

## 2017-02-27 NOTE — ED AVS SNAPSHOT
Guang Lian Shi Dai Access Code: [email or web address]  Expires: 3/29/2017  8:42 PM    Your email address is not on file at [email or web address].  Email Addresses are required for you to sign up for Guang Lian [employer] Dai, please contact [phone] to verify your personal information and to provide your email address prior to attempting to register for Guang Lian Shi Dai.    Werner Wadsworth  [address] [place] [address]    Guang Lian Shi Dai  A secure, online tool to manage your health information     [employer]’s Guang Lian Shi Dai® is a secure, online tool that connects you to your personalized health information from the privacy of your home -- day or night - making it very easy for you to manage your healthcare. Once the activation process is completed, you can even access your medical information using the Guang Lian Shi Dai giovanni, which is available for free in the Apple Giovanni store or Google Play store.     To learn more about Guang Lian Shi Dai, visit [email or web address]    There are two levels of access available (as shown below):   My Chart Features  [facility] Primary Care Doctor [facility]  Specialists [facility]  Urgent  Care Non-[facility] Primary Care Doctor   Email your healthcare team securely and privately 24/7 X X X    Manage appointments: schedule your next appointment; view details of past/upcoming appointments X      Request prescription refills. X      View recent personal medical records, including lab and immunizations X X X X   View health record, including health history, allergies, medications X X X X   Read reports about your outpatient visits, procedures, consult and ER notes X X X X   See your discharge summary, which is a recap of your hospital and/or ER visit that includes your diagnosis, lab results, and care plan X X  X     How to register for [facility]:  Once your e-mail address has been verified, follow the following steps to sign up for Guang Lian Shi Dai.     1. Go to  [email or web address]  2. Click on the Sign Up Now box, which takes you to the New Member Sign Up page.  You will need to provide the following information:  a. Enter your C[employer]Songs Access Code exactly as it appears at the top of this page. (You will not need to use this code after you’ve completed the sign-up process. If you do not sign up before the expiration date, you must request a new code.)   b. Enter your date of birth.   c. Enter your home email address.   d. Click Submit, and follow the next screen’s instructions.  3. Create a Reelhouset ID. This will be your CueSongs login ID and cannot be changed, so think of one that is secure and easy to remember.  4. Create a CueSongs password. You can change your password at any time.  5. Enter your Password Reset Question and Answer. This can be used at a later time if you forget your password.   6. Enter your e-mail address. This allows you to receive e-mail notifications when new information is available in [employer]Songs.  7. Click Sign Up. You can now view your health information.    For assistance activating your [employer]Songs account, call [phone]

## 2017-02-27 NOTE — ED AVS SNAPSHOT
2/27/2017          Werner Wadsworth  [address] [place] [address]    Dear Werner:    Critical access hospital wants to ensure your discharge home is safe and you or your loved ones have had all your questions answered regarding your care after you leave the hospital.    You may receive a telephone call within two days of your discharge.  This call is to make certain you understand your discharge instructions as well as ensure we provided you with the best care possible during your stay with us.     The call will only last approximately 3-5 minutes and will be done by a nurse.    Once again, we want to ensure your discharge home is safe and that you have a clear understanding of any next steps in your care.  If you have any questions or concerns, please do not hesitate to contact us, we are here for you.  Thank you for choosing [facility] for your healthcare needs.    Sincerely,    Chung Campbell    [facility]

## 2017-02-28 NOTE — ED NOTES
Assumed care of pt using AIDET.  ERP at bedside to address wound.  Unable to draw blood for lab at this time.
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
## 2017-02-28 NOTE — DISCHARGE INSTRUCTIONS
Head Injury, Adult  You have a head injury. Headaches and throwing up (vomiting) are common after a head injury. It should be easy to wake up from sleeping. Sometimes you must stay in the hospital. Most problems happen within the first 24 hours. Side effects may occur up to 7-10 days after the injury.   WHAT ARE THE TYPES OF HEAD INJURIES?  Head injuries can be as minor as a bump. Some head injuries can be more severe. More severe head injuries include:  · A jarring injury to the brain (concussion).  · A bruise of the brain (contusion). This mean there is bleeding in the brain that can cause swelling.  · A cracked skull (skull fracture).  · Bleeding in the brain that collects, clots, and forms a bump (hematoma).  WHEN SHOULD I GET HELP RIGHT AWAY?   · You are confused or sleepy.  · You cannot be woken up.  · You feel sick to your stomach (nauseous) or keep throwing up (vomiting).  · Your dizziness or unsteadiness is getting worse.  · You have very bad, lasting headaches that are not helped by medicine. Take medicines only as told by your doctor.  · You cannot use your arms or legs like normal.  · You cannot walk.  · You notice changes in the black spots in the center of the colored part of your eye (pupil).  · You have clear or bloody fluid coming from your nose or ears.  · You have trouble seeing.  During the next 24 hours after the injury, you must stay with someone who can watch you. This person should get help right away (call 911 in the [place] if you start to shake and are not able to control it (have seizures), you pass out, or you are unable to wake up.  HOW CAN I PREVENT A HEAD INJURY IN THE FUTURE?  · Wear seat belts.  · Wear a helmet while bike riding and playing sports like football.  · Stay away from dangerous activities around the house.  WHEN CAN I RETURN TO NORMAL ACTIVITIES AND ATHLETICS?  See your doctor before doing these activities. You should not do normal activities or play contact sports until 1  week after the following symptoms have stopped:  · Headache that does not go away.  · Dizziness.  · Poor attention.  · Confusion.  · Memory problems.  · Sickness to your stomach or throwing up.  · Tiredness.  · Fussiness.  · Bothered by bright lights or loud noises.  · Anxiousness or depression.  · Restless sleep.  MAKE SURE YOU:   · Understand these instructions.  · Will watch your condition.  · Will get help right away if you are not doing well or get worse.     This information is not intended to replace advice given to you by your health care provider. Make sure you discuss any questions you have with your health care provider.     Document Released: 11/30/2009 Document Revised: 01/08/2016 Document Reviewed: 08/25/2014  [employer] Interactive Patient Education ©2016 [employer]    Laceration Care, Adult  A laceration is a cut that goes through all of the layers of the skin and into the tissue that is right under the skin. Some lacerations heal on their own. Others need to be closed with stitches (sutures), staples, skin adhesive strips, or skin glue. Proper laceration care minimizes the risk of infection and helps the laceration to heal better.  HOW TO CARE FOR YOUR LACERATION  If sutures or staples were used:  · Keep the wound clean and dry.  · If you were given a bandage (dressing), you should change it at least one time per day or as told by your health care provider. You should also change it if it becomes wet or dirty.  · Keep the wound completely dry for the first 24 hours or as told by your health care provider. After that time, you may shower or bathe. However, make sure that the wound is not soaked in water until after the sutures or staples have been removed.  · Clean the wound one time each day or as told by your health care provider:  · Wash the wound with soap and water.  · Rinse the wound with water to remove all soap.  · Pat the wound dry with a clean towel. Do not rub the wound.  · After cleaning  the wound, apply a thin layer of antibiotic ointment as told by your health care provider. This will help to prevent infection and keep the dressing from sticking to the wound.  · Have the sutures or staples removed as told by your health care provider.  If skin adhesive strips were used:  · Keep the wound clean and dry.  · If you were given a bandage (dressing), you should change it at least one time per day or as told by your health care provider. You should also change it if it becomes dirty or wet.  · Do not get the skin adhesive strips wet. You may shower or bathe, but be careful to keep the wound dry.  · If the wound gets wet, pat it dry with a clean towel. Do not rub the wound.  · Skin adhesive strips fall off on their own. You may trim the strips as the wound heals. Do not remove skin adhesive strips that are still stuck to the wound. They will fall off in time.  If skin glue was used:  · Try to keep the wound dry, but you may briefly wet it in the shower or bath. Do not soak the wound in water, such as by swimming.  · After you have showered or bathed, gently pat the wound dry with a clean towel. Do not rub the wound.  · Do not do any activities that will make you sweat heavily until the skin glue has fallen off on its own.  · Do not apply liquid, cream, or ointment medicine to the wound while the skin glue is in place. Using those may loosen the film before the wound has healed.  · If you were given a bandage (dressing), you should change it at least one time per day or as told by your health care provider. You should also change it if it becomes dirty or wet.  · If a dressing is placed over the wound, be careful not to apply tape directly over the skin glue. Doing that may cause the glue to be pulled off before the wound has healed.  · Do not pick at the glue. The skin glue usually remains in place for 5-10 days, then it falls off of the skin.  General Instructions  · Take over-the-counter and  prescription medicines only as told by your health care provider.  · If you were prescribed an antibiotic medicine or ointment, take or apply it as told by your doctor. Do not stop using it even if your condition improves.  · To help prevent scarring, make sure to cover your wound with sunscreen whenever you are outside after stitches are removed, after adhesive strips are removed, or when glue remains in place and the wound is healed. Make sure to wear a sunscreen of at least 30 SPF.  · Do not scratch or pick at the wound.  · Keep all follow-up visits as told by your health care provider. This is important.  · Check your wound every day for signs of infection. Watch for:  ¨ Redness, swelling, or pain.  ¨ Fluid, blood, or pus.  · Raise (elevate) the injured area above the level of your heart while you are sitting or lying down, if possible.  SEEK MEDICAL CARE IF:  · You received a tetanus shot and you have swelling, severe pain, redness, or bleeding at the injection site.  · You have a fever.  · A wound that was closed breaks open.  · You notice a bad smell coming from your wound or your dressing.  · You notice something coming out of the wound, such as wood or glass.  · Your pain is not controlled with medicine.  · You have increased redness, swelling, or pain at the site of your wound.  · You have fluid, blood, or pus coming from your wound.  · You notice a change in the color of your skin near your wound.  · You need to change the dressing frequently due to fluid, blood, or pus draining from the wound.  · You develop a new rash.  · You develop numbness around the wound.  SEEK IMMEDIATE MEDICAL CARE IF:  · You develop severe swelling around the wound.  · Your pain suddenly increases and is severe.  · You develop painful lumps near the wound or on skin that is anywhere on your body.  · You have a red streak going away from your wound.  · The wound is on your hand or foot and you cannot properly move a finger or  toe.  · The wound is on your hand or foot and you notice that your fingers or toes look pale or bluish.     This information is not intended to replace advice given to you by your health care provider. Make sure you discuss any questions you have with your health care provider.     Document Released: 12/18/2006 Document Revised: 05/03/2016 Document Reviewed: 12/14/2015  [employer] Interactive Patient Education ©2016 [employer].    Staple Wound Closure  Staples are used to help a wound heal faster by holding the edges of the wound together.  HOME CARE  · Keep the area around the staples clean and dry.  · Rest and raise (elevate) the injured part above the level of your heart.  · See your doctor for a follow-up check of the wound.  · See your doctor to have the staples removed.  · Clean the wound daily with water.  · Do not soak the wound in water for long periods of time.  · Let air reach the wound as it heals.  GET HELP RIGHT AWAY IF:   · You have redness or puffiness around the wound.  · You have a red line going away from the wound.  · You have more pain or tenderness.  · You have yellowish-white fluid (pus) coming from the wound.  · Your wound does not stay together after the staples have been taken out.  · You see something coming out of the wound, such as wood or glass.  · You have problems moving the injured area.  · You have a fever or lasting symptoms for more than 2-3 days.  · You have a fever and your symptoms suddenly get worse.  MAKE SURE YOU:   · Understand these instructions.  · Will watch this condition.  · Will get help right away if you are not doing well or get worse.  Document Released: 09/26/2009 Document Revised: 09/11/2013 Document Reviewed: 06/30/2013  [employer]® Patient Information ©2014 [employer].

## 2017-02-28 NOTE — ED NOTES
Dc instructions and prescriptions given.  Pt to return for suture/staple removal in 7-10 days, earlier for s/s of infection. States spouse to  due to meds recvd in er
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
## 2017-03-03 ENCOUNTER — HOSPITAL ENCOUNTER (EMERGENCY)
Facility: MEDICAL CENTER | Age: 47
End: 2017-03-03
Payer: COMMERCIAL

## 2017-03-09 ENCOUNTER — HOSPITAL ENCOUNTER (EMERGENCY)
Facility: MEDICAL CENTER | Age: 47
End: 2017-03-09
Payer: COMMERCIAL

## 2017-03-09 VITALS
DIASTOLIC BLOOD PRESSURE: 49 MMHG | HEART RATE: 93 BPM | RESPIRATION RATE: 16 BRPM | SYSTOLIC BLOOD PRESSURE: 127 MMHG | WEIGHT: 170 LBS | BODY MASS INDEX: 26.68 KG/M2 | HEIGHT: 67 IN | TEMPERATURE: 98.7 F

## 2017-03-09 PROCEDURE — 99281 EMR DPT VST MAYX REQ PHY/QHP: CPT

## 2017-03-09 ASSESSMENT — PAIN SCALES - GENERAL: PAINLEVEL_OUTOF10: 0
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
## 2019-02-26 ENCOUNTER — OFFICE VISIT (OUTPATIENT)
Dept: URGENT CARE | Facility: PHYSICIAN GROUP | Age: 49
End: 2019-02-26
Payer: COMMERCIAL

## 2019-02-26 ENCOUNTER — HOSPITAL ENCOUNTER (OUTPATIENT)
Dept: RADIOLOGY | Facility: MEDICAL CENTER | Age: 49
End: 2019-02-26
Attending: EMERGENCY MEDICINE
Payer: COMMERCIAL

## 2019-02-26 VITALS
BODY MASS INDEX: 21.97 KG/M2 | HEIGHT: 67 IN | OXYGEN SATURATION: 100 % | DIASTOLIC BLOOD PRESSURE: 94 MMHG | SYSTOLIC BLOOD PRESSURE: 122 MMHG | TEMPERATURE: 98.2 F | WEIGHT: 140 LBS | HEART RATE: 89 BPM

## 2019-02-26 DIAGNOSIS — R42 LIGHTHEADEDNESS: ICD-10-CM

## 2019-02-26 DIAGNOSIS — M79.644 FINGER PAIN, RIGHT: ICD-10-CM

## 2019-02-26 DIAGNOSIS — S66.194A OTHER INJURY OF FLEXOR MUSCLE, FASCIA AND TENDON OF RIGHT RING FINGER AT WRIST AND HAND LEVEL, INITIAL ENCOUNTER: ICD-10-CM

## 2019-02-26 PROCEDURE — 99203 OFFICE O/P NEW LOW 30 MIN: CPT | Performed by: EMERGENCY MEDICINE

## 2019-02-26 PROCEDURE — 73140 X-RAY EXAM OF FINGER(S): CPT | Mod: RT

## 2019-02-26 RX ORDER — SERTRALINE HYDROCHLORIDE 100 MG/1
100 TABLET, FILM COATED ORAL DAILY
COMMUNITY

## 2019-02-26 ASSESSMENT — ENCOUNTER SYMPTOMS
NUMBNESS: 1
DIZZINESS: 1
COUGH: 0
SORE THROAT: 0
HEADACHES: 0
SHORTNESS OF BREATH: 0
FOCAL WEAKNESS: 0
PALPITATIONS: 0
DIARRHEA: 0
ABDOMINAL PAIN: 0
FEVER: 0
VOMITING: 0
NERVOUS/ANXIOUS: 1
JOINT SWELLING: 1
NAUSEA: 0
LOSS OF CONSCIOUSNESS: 0

## 2019-02-26 NOTE — LETTER
February 26, 2019       Patient: Werner Mendenhall   YOB: 1970   Date of Visit: 2/26/2019         To Whom It May Concern:    It is my medical opinion that Werner Mendenhall should not attend work today.      Sincerely,          Saul Bradshaw M.D.  Electronically Signed

## 2019-02-26 NOTE — PROGRESS NOTES
Subjective:      Werner Mendenhall is a 49 y.o. female who presents with Dizziness (x 1 day, right finger cramping and bruising)            Hand Injury   This is a new problem. The current episode started today. The problem has been unchanged. Associated symptoms include congestion, joint swelling and numbness. Pertinent negatives include no abdominal pain, chest pain, coughing, fever, headaches, nausea, rash, sore throat or vomiting. The symptoms are aggravated by bending. She has tried nothing for the symptoms.   Patient notes abrupt onset of right ring finger DIP joint swelling, bruising, pain.  No specific known trauma.  Subsequently noted episode of lightheadedness; no syncope, associated with anxiety.  PMH panic disorder    Review of Systems   Constitutional: Negative for fever.   HENT: Positive for congestion. Negative for ear pain, hearing loss, nosebleeds and sore throat.    Respiratory: Negative for cough and shortness of breath.    Cardiovascular: Negative for chest pain and palpitations.   Gastrointestinal: Negative for abdominal pain, diarrhea, nausea and vomiting.   Musculoskeletal: Positive for joint swelling.   Skin: Negative for rash.   Neurological: Positive for dizziness and numbness. Negative for focal weakness, loss of consciousness and headaches.   Psychiatric/Behavioral: The patient is nervous/anxious.      PMH:  has a past medical history of Anesthesia; Gynecological disorder; Pain; Psychiatric problem; and Renal disorder (2017).  MEDS:   Current Outpatient Prescriptions:   •  sertraline (ZOLOFT) 100 MG Tab, Take 100 mg by mouth every day., Disp: , Rfl:   •  ibuprofen (MOTRIN) 800 MG Tab, Take 800 mg by mouth every 6 hours as needed for Moderate Pain., Disp: , Rfl:   •  oxycodone-acetaminophen (PERCOCET) 5-325 MG Tab, Take 1-2 Tabs by mouth every four hours as needed for Moderate Pain. (Patient not taking: Reported on 2/26/2019), Disp: 30 Tab, Rfl: 0  •  Vortioxetine HBr (TRINTELLIX)  "10 MG Tab, Take 10 mg by mouth every bedtime., Disp: , Rfl:   ALLERGIES: No Known Allergies  SURGHX:   Past Surgical History:   Procedure Laterality Date   • HYSTEROSCOPY WITH VIDEO DIAGNOSTIC  2/4/2017    Procedure: HYSTEROSCOPY WITH VIDEO DIAGNOSTIC;  Surgeon: Ghulam Kirkland M.D.;  Location: SURGERY [facility];  Service:    • DILATION AND CURETTAGE  2/4/2017    Procedure: DILATION AND CURETTAGE;  Surgeon: Ghulam Kirkland M.D.;  Location: SURGERY [facility];  Service:    • HYSTEROSCOPY THERMAL ABLATION  2/4/2017    Procedure: HYSTEROSCOPY THERMAL ABLATION;  Surgeon: Ghulam Kirkland M.D.;  Location: SURGERY [facility];  Service:    • GYN SURGERY  2012    tubal ligation   • OTHER  2006    Lumpectomy left breast     SOCHX:  reports that she has been smoking.  She has a 10.00 pack-year smoking history. She has never used smokeless tobacco. She reports that she drinks alcohol. She reports that she does not use drugs.  FH: family history is not on file.       Objective:     /94   Pulse 89   Temp 36.8 °C (98.2 °F) (Temporal)   Ht 1.702 m (5' 7\")   Wt 63.5 kg (140 lb)   SpO2 100%   BMI 21.93 kg/m²      Physical Exam   Constitutional: She is oriented to person, place, and time. She appears well-developed and well-nourished. She is cooperative.  Non-toxic appearance. She does not have a sickly appearance. No distress.   HENT:   Head: Normocephalic.   Right Ear: Tympanic membrane and ear canal normal.   Left Ear: Tympanic membrane and ear canal normal.   Nose: No mucosal edema or rhinorrhea.   Mouth/Throat: No uvula swelling. No oropharyngeal exudate, posterior oropharyngeal edema or posterior oropharyngeal erythema.   Eyes: Pupils are equal, round, and reactive to light. Conjunctivae, EOM and lids are normal.   Neck: Trachea normal and phonation normal. Neck supple.   Cardiovascular: Normal rate, regular rhythm and normal heart sounds.  Exam reveals no gallop.    No murmur " heard.  Pulmonary/Chest: Effort normal and breath sounds normal.   Abdominal: Soft. There is no tenderness.   Musculoskeletal:        Right hand: She exhibits tenderness and swelling. She exhibits normal range of motion and no deformity.        Hands:  Lymphadenopathy:     She has no cervical adenopathy.   Neurological: She is alert and oriented to person, place, and time. She displays no tremor. No cranial nerve deficit or sensory deficit. Coordination normal.   Skin: Skin is warm and dry. No rash noted.   Psychiatric: She has a normal mood and affect.               Assessment/Plan:     1. Other injury of flexor muscle, fascia and tendon of right ring finger at wrist and hand level, initial encounter  Finger strapping prn  Ice, Tylenol prn  2. Lightheadedness  Suspect panic/anxiety related; resolving.  Recommended supportive care measures, including rest, increasing oral fluid intake and recheck if worsening.  3. Finger pain, right  - DX-FINGER(S) 2+ RIGHT; per radiologist:  No evidence of bony abnormality.

## 2019-02-26 NOTE — LETTER
February 26, 2019         Patient: Werner Mendenhall   YOB: 1970   Date of Visit: 2/26/2019           To Whom it May Concern:    Werner Mendenhall was seen in my clinic on 2/26/2019. She may return to work 2/27/2019.    If you have any questions or concerns, please don't hesitate to call.        Sincerely,           Saul Bradshaw M.D.  Electronically Signed

## 2019-05-01 ENCOUNTER — HOSPITAL ENCOUNTER (OUTPATIENT)
Dept: LAB | Facility: MEDICAL CENTER | Age: 49
End: 2019-05-01
Attending: EMERGENCY MEDICINE
Payer: COMMERCIAL

## 2019-05-01 ENCOUNTER — OFFICE VISIT (OUTPATIENT)
Dept: URGENT CARE | Facility: PHYSICIAN GROUP | Age: 49
End: 2019-05-01
Payer: COMMERCIAL

## 2019-05-01 VITALS
DIASTOLIC BLOOD PRESSURE: 64 MMHG | BODY MASS INDEX: 23.07 KG/M2 | HEIGHT: 67 IN | HEART RATE: 76 BPM | OXYGEN SATURATION: 98 % | TEMPERATURE: 98.2 F | WEIGHT: 147 LBS | RESPIRATION RATE: 14 BRPM | SYSTOLIC BLOOD PRESSURE: 118 MMHG

## 2019-05-01 DIAGNOSIS — R00.2 PALPITATIONS: ICD-10-CM

## 2019-05-01 DIAGNOSIS — I49.9 CARDIAC ARRHYTHMIA, UNSPECIFIED CARDIAC ARRHYTHMIA TYPE: ICD-10-CM

## 2019-05-01 LAB
ALBUMIN SERPL BCP-MCNC: 4.5 G/DL (ref 3.2–4.9)
ALBUMIN/GLOB SERPL: 1.4 G/DL
ALP SERPL-CCNC: 47 U/L (ref 30–99)
ALT SERPL-CCNC: 13 U/L (ref 2–50)
ANION GAP SERPL CALC-SCNC: 7 MMOL/L (ref 0–11.9)
AST SERPL-CCNC: 20 U/L (ref 12–45)
BASOPHILS # BLD AUTO: 0.6 % (ref 0–1.8)
BASOPHILS # BLD: 0.05 K/UL (ref 0–0.12)
BILIRUB SERPL-MCNC: 0.5 MG/DL (ref 0.1–1.5)
BUN SERPL-MCNC: 9 MG/DL (ref 8–22)
CALCIUM SERPL-MCNC: 9.3 MG/DL (ref 8.5–10.5)
CHLORIDE SERPL-SCNC: 106 MMOL/L (ref 96–112)
CO2 SERPL-SCNC: 26 MMOL/L (ref 20–33)
CREAT SERPL-MCNC: 0.69 MG/DL (ref 0.5–1.4)
EOSINOPHIL # BLD AUTO: 0.04 K/UL (ref 0–0.51)
EOSINOPHIL NFR BLD: 0.5 % (ref 0–6.9)
ERYTHROCYTE [DISTWIDTH] IN BLOOD BY AUTOMATED COUNT: 50.6 FL (ref 35.9–50)
GLOBULIN SER CALC-MCNC: 3.2 G/DL (ref 1.9–3.5)
GLUCOSE SERPL-MCNC: 100 MG/DL (ref 65–99)
HCT VFR BLD AUTO: 46 % (ref 37–47)
HGB BLD-MCNC: 14.7 G/DL (ref 12–16)
IMM GRANULOCYTES # BLD AUTO: 0.04 K/UL (ref 0–0.11)
IMM GRANULOCYTES NFR BLD AUTO: 0.5 % (ref 0–0.9)
LYMPHOCYTES # BLD AUTO: 2.01 K/UL (ref 1–4.8)
LYMPHOCYTES NFR BLD: 24.6 % (ref 22–41)
MAGNESIUM SERPL-MCNC: 2.5 MG/DL (ref 1.5–2.5)
MCH RBC QN AUTO: 33.2 PG (ref 27–33)
MCHC RBC AUTO-ENTMCNC: 32 G/DL (ref 33.6–35)
MCV RBC AUTO: 103.8 FL (ref 81.4–97.8)
MONOCYTES # BLD AUTO: 0.82 K/UL (ref 0–0.85)
MONOCYTES NFR BLD AUTO: 10 % (ref 0–13.4)
NEUTROPHILS # BLD AUTO: 5.2 K/UL (ref 2–7.15)
NEUTROPHILS NFR BLD: 63.8 % (ref 44–72)
NRBC # BLD AUTO: 0 K/UL
NRBC BLD-RTO: 0 /100 WBC
PLATELET # BLD AUTO: 275 K/UL (ref 164–446)
PMV BLD AUTO: 10.3 FL (ref 9–12.9)
POTASSIUM SERPL-SCNC: 4 MMOL/L (ref 3.6–5.5)
PROT SERPL-MCNC: 7.7 G/DL (ref 6–8.2)
RBC # BLD AUTO: 4.43 M/UL (ref 4.2–5.4)
SODIUM SERPL-SCNC: 139 MMOL/L (ref 135–145)
WBC # BLD AUTO: 8.2 K/UL (ref 4.8–10.8)

## 2019-05-01 PROCEDURE — 84443 ASSAY THYROID STIM HORMONE: CPT

## 2019-05-01 PROCEDURE — 36415 COLL VENOUS BLD VENIPUNCTURE: CPT

## 2019-05-01 PROCEDURE — 83735 ASSAY OF MAGNESIUM: CPT

## 2019-05-01 PROCEDURE — 93000 ELECTROCARDIOGRAM COMPLETE: CPT | Performed by: EMERGENCY MEDICINE

## 2019-05-01 PROCEDURE — 99214 OFFICE O/P EST MOD 30 MIN: CPT | Performed by: EMERGENCY MEDICINE

## 2019-05-01 PROCEDURE — 85025 COMPLETE CBC W/AUTO DIFF WBC: CPT

## 2019-05-01 PROCEDURE — 80053 COMPREHEN METABOLIC PANEL: CPT

## 2019-05-01 ASSESSMENT — ENCOUNTER SYMPTOMS
NERVOUS/ANXIOUS: 1
NAUSEA: 0
NEAR-SYNCOPE: 0
LOSS OF CONSCIOUSNESS: 0
SHORTNESS OF BREATH: 0
PALPITATIONS: 1
SYNCOPE: 0
FOCAL WEAKNESS: 0
FEVER: 0
IRREGULAR HEARTBEAT: 1
SENSORY CHANGE: 0
INSOMNIA: 0
DIZZINESS: 1
VOMITING: 0

## 2019-05-01 ASSESSMENT — LIFESTYLE VARIABLES: SUBSTANCE_ABUSE: 0

## 2019-05-01 NOTE — PROGRESS NOTES
Subjective:      Werner Mendenhall is a 49 y.o. female who presents with Irregular Heart Beat (dizziness)            Palpitations    This is a recurrent problem. Episode onset: several years ago. The problem occurs intermittently. Progression since onset: worsened last few days. The symptoms are aggravated by stress. Associated symptoms include anxiety, dizziness and an irregular heartbeat. Pertinent negatives include no chest pain, fever, nausea, near-syncope, shortness of breath, syncope or vomiting. She has tried nothing for the symptoms. Her past medical history is significant for anxiety.   Notes at onset of symptoms several years ago had cardiac work-up; no specific diagnosis known.  Notes intermittent skipped beat sensation; worsened last few days.  No recent illness, notes sensation provokes some increased anxiety.  No change in medications.    Review of Systems   Constitutional: Negative for fever.   Respiratory: Negative for shortness of breath.    Cardiovascular: Positive for palpitations. Negative for chest pain, leg swelling, syncope and near-syncope.   Gastrointestinal: Negative for nausea and vomiting.   Neurological: Positive for dizziness. Negative for sensory change, focal weakness and loss of consciousness.   Psychiatric/Behavioral: Negative for substance abuse. The patient is nervous/anxious. The patient does not have insomnia.      PMH:  has a past medical history of Anesthesia; Gynecological disorder; Pain; Psychiatric problem; and Renal disorder (2017).  MEDS:   Current Outpatient Prescriptions:   •  sertraline (ZOLOFT) 100 MG Tab, Take 100 mg by mouth every day., Disp: , Rfl:   •  ibuprofen (MOTRIN) 800 MG Tab, Take 800 mg by mouth every 6 hours as needed for Moderate Pain., Disp: , Rfl:   •  oxycodone-acetaminophen (PERCOCET) 5-325 MG Tab, Take 1-2 Tabs by mouth every four hours as needed for Moderate Pain. (Patient not taking: Reported on 2/26/2019), Disp: 30 Tab, Rfl: 0  •   "Vortioxetine HBr (TRINTELLIX) 10 MG Tab, Take 10 mg by mouth every bedtime., Disp: , Rfl:   ALLERGIES: No Known Allergies  SURGHX:   Past Surgical History:   Procedure Laterality Date   • HYSTEROSCOPY WITH VIDEO DIAGNOSTIC  2/4/2017    Procedure: HYSTEROSCOPY WITH VIDEO DIAGNOSTIC;  Surgeon: Ghulam Kirkland M.D.;  Location: SURGERY [facility];  Service:    • DILATION AND CURETTAGE  2/4/2017    Procedure: DILATION AND CURETTAGE;  Surgeon: Ghulam Kirkland M.D.;  Location: SURGERY [facility];  Service:    • HYSTEROSCOPY THERMAL ABLATION  2/4/2017    Procedure: HYSTEROSCOPY THERMAL ABLATION;  Surgeon: Ghulam Kirkland M.D.;  Location: SURGERY [facility];  Service:    • GYN SURGERY  2012    tubal ligation   • OTHER  2006    Lumpectomy left breast     SOCHX:  reports that she has been smoking.  She has a 10.00 pack-year smoking history. She has never used smokeless tobacco. She reports that she drinks alcohol. She reports that she does not use drugs.  FH: family history is not on file.       Objective:     /64   Pulse 76   Temp 36.8 °C (98.2 °F)   Resp 14   Ht 1.702 m (5' 7\")   Wt 66.7 kg (147 lb)   SpO2 98%   BMI 23.02 kg/m²      Physical Exam   Constitutional: She is oriented to person, place, and time. She appears well-developed and well-nourished. She is cooperative. She does not have a sickly appearance. She does not appear ill. No distress.   HENT:   Mouth/Throat: Oropharynx is clear and moist.   Eyes: Conjunctivae and lids are normal.   Neck: Phonation normal. Neck supple. No JVD present. No thyromegaly present.   Cardiovascular: Normal rate, regular rhythm and normal heart sounds.   Occasional extrasystoles are present. Exam reveals no gallop and no friction rub.    No murmur heard.  Not tachycardic.   Pulmonary/Chest: Effort normal. She has no wheezes. She has no rhonchi. She has no rales.   Abdominal: Soft. She exhibits no distension. There is no tenderness.   Neurological: She is " alert and oriented to person, place, and time. She displays no tremor. Gait normal.   Skin: Skin is warm and dry.   Psychiatric: She has a normal mood and affect. Her speech is normal and behavior is normal.          Advised ED evaluation if any significant change in symptoms; near syncope, chest pain, dyspnea.     Assessment/Plan:     1. Cardiac arrhythmia, unspecified cardiac arrhythmia type  REF PCP    2. Palpitations  Sinus tachycardia, sinus pauses, normal axis, no ST-T wave abnormality- EKG - Clinic Performed  - REFERRAL TO CARDIOLOGY  - CBC WITH DIFFERENTIAL; Future  - Comp Metabolic Panel; Future  - TSH WITH REFLEX TO FT4; Future  - MAGNESIUM; Future

## 2019-05-01 NOTE — PATIENT INSTRUCTIONS
Palpitations  Introduction  A palpitation is the feeling that your heart:  · Has an uneven (irregular) heartbeat.  · Is beating faster than normal.  · Is fluttering.  · Is skipping a beat.  This is usually not a serious problem. In some cases, you may need more medical tests.  Follow these instructions at home:  · Avoid:  ¨ Caffeine in coffee, tea, soft drinks, diet pills, and energy drinks.  ¨ Chocolate.  ¨ Alcohol.  · Do not use any tobacco products. These include cigarettes, chewing tobacco, and e-cigarettes. If you need help quitting, ask your doctor.  · Try to reduce your stress. These things may help:  ¨ Yoga.  ¨ Meditation.  ¨ Physical activity. Swimming, jogging, and walking are good choices.  ¨ A method that helps you use your mind to control things in your body, like heartbeats (biofeedback).  · Get plenty of rest and sleep.  · Take over-the-counter and prescription medicines only as told by your doctor.  · Keep all follow-up visits as told by your doctor. This is important.  Contact a doctor if:  · Your heartbeat is still fast or uneven after 24 hours.  · Your palpitations occur more often.  Get help right away if:  · You have chest pain.  · You feel short of breath.  · You have a very bad headache.  · You feel dizzy.  · You pass out (faint).  This information is not intended to replace advice given to you by your health care provider. Make sure you discuss any questions you have with your health care provider.  Document Released: 09/26/2009 Document Revised: 05/25/2017 Document Reviewed: 09/01/2016  © 2017 [employer]

## 2019-05-02 ENCOUNTER — TELEPHONE (OUTPATIENT)
Dept: URGENT CARE | Facility: PHYSICIAN GROUP | Age: 49
End: 2019-05-02

## 2019-05-02 LAB — TSH SERPL DL<=0.005 MIU/L-ACNC: 1.44 UIU/ML (ref 0.38–5.33)

## 2019-08-18 ENCOUNTER — OFFICE VISIT (OUTPATIENT)
Dept: URGENT CARE | Facility: PHYSICIAN GROUP | Age: 49
End: 2019-08-18
Payer: COMMERCIAL

## 2019-08-18 VITALS
OXYGEN SATURATION: 100 % | WEIGHT: 150 LBS | BODY MASS INDEX: 23.54 KG/M2 | SYSTOLIC BLOOD PRESSURE: 122 MMHG | RESPIRATION RATE: 12 BRPM | TEMPERATURE: 98.9 F | DIASTOLIC BLOOD PRESSURE: 74 MMHG | HEART RATE: 64 BPM | HEIGHT: 67 IN

## 2019-08-18 DIAGNOSIS — J06.9 VIRAL UPPER RESPIRATORY TRACT INFECTION: ICD-10-CM

## 2019-08-18 DIAGNOSIS — J02.9 PHARYNGITIS, UNSPECIFIED ETIOLOGY: ICD-10-CM

## 2019-08-18 LAB
INT CON NEG: NEGATIVE
INT CON POS: POSITIVE
S PYO AG THROAT QL: NEGATIVE

## 2019-08-18 PROCEDURE — 87880 STREP A ASSAY W/OPTIC: CPT | Performed by: PHYSICIAN ASSISTANT

## 2019-08-18 PROCEDURE — 99213 OFFICE O/P EST LOW 20 MIN: CPT | Performed by: PHYSICIAN ASSISTANT

## 2019-08-18 ASSESSMENT — ENCOUNTER SYMPTOMS
SHORTNESS OF BREATH: 0
FEVER: 0
NAUSEA: 0
SINUS PAIN: 1
CHILLS: 0
MYALGIAS: 1
HEADACHES: 1
SORE THROAT: 0
DIZZINESS: 0
VOMITING: 0
ABDOMINAL PAIN: 0
COUGH: 0

## 2019-08-18 NOTE — PROGRESS NOTES
Subjective:      Werner Mendenhall is a 49 y.o. female who presents with Sinus Problem (sinus pressure x 4 days increased yesterday )            49-year-old female presents to urgent care complaining of sinus pain onset 4 days ago, started as tooth pain and now pain in both sinuses. Denies dental caries. Reports nasal congestion and generalized fatigue. Denies ear pain, f/c, n/v, or ST. Has tried OTC meds with some relief.   Hx of similar with sinus infection ~4 years ago.  Denies other associated alleviating or aggravating factors.  Daughter presents in urgent care today with patient complaining of similar symptoms.       Review of Systems   Constitutional: Positive for malaise/fatigue. Negative for chills and fever.   HENT: Positive for congestion, nosebleeds and sinus pain. Negative for ear pain and sore throat.    Respiratory: Negative for cough and shortness of breath.    Cardiovascular: Negative for chest pain.   Gastrointestinal: Negative for abdominal pain, nausea and vomiting.   Genitourinary: Negative for dysuria.   Musculoskeletal: Positive for myalgias.   Skin: Negative for rash.   Neurological: Positive for headaches. Negative for dizziness.     Past Medical History:   Diagnosis Date   • Anesthesia     post op n/v   • Gynecological disorder    • Pain    • Psychiatric problem    • Renal disorder 2017    kidney stones     Current Outpatient Medications on File Prior to Visit   Medication Sig Dispense Refill   • sertraline (ZOLOFT) 100 MG Tab Take 100 mg by mouth every day.     • ibuprofen (MOTRIN) 800 MG Tab Take 800 mg by mouth every 6 hours as needed for Moderate Pain.     • oxycodone-acetaminophen (PERCOCET) 5-325 MG Tab Take 1-2 Tabs by mouth every four hours as needed for Moderate Pain. (Patient not taking: Reported on 2/26/2019) 30 Tab 0   • Vortioxetine HBr (TRINTELLIX) 10 MG Tab Take 10 mg by mouth every bedtime.       No current facility-administered medications on file prior to visit.   "    .tobi  Social History     Tobacco Use   • Smoking status: Current Every Day Smoker     Packs/day: 0.50     Years: 20.00     Pack years: 10.00   • Smokeless tobacco: Never Used   Substance Use Topics   • Alcohol use: Yes     Comment: 5-10 per week          Objective:     /74 (BP Location: Left arm, Patient Position: Sitting, BP Cuff Size: Adult)   Pulse 64   Temp 37.2 °C (98.9 °F) (Temporal)   Resp 12   Ht 1.702 m (5' 7\")   Wt 68 kg (150 lb)   SpO2 100%   BMI 23.49 kg/m²      Physical Exam   HENT:   Head: Normocephalic and atraumatic.   Right Ear: Tympanic membrane and ear canal normal.   Left Ear: Tympanic membrane and ear canal normal.   Nose: Mucosal edema and rhinorrhea present. Right sinus exhibits maxillary sinus tenderness. Left sinus exhibits maxillary sinus tenderness.   Mouth/Throat: Uvula is midline and mucous membranes are normal. Mucous membranes are not dry. Posterior oropharyngeal erythema present. No oropharyngeal exudate. Tonsils are 1+ on the right. Tonsils are 1+ on the left.   Eyes: Conjunctivae and EOM are normal.   Neck: Normal range of motion. Neck supple.   Lymphadenopathy:     She has cervical adenopathy.            Assessment/Plan:     1. Pharyngitis, unspecified etiology  POCT Rapid Strep A   2. Viral upper respiratory tract infection       Rapid strep test negative. Advised patient symptoms are most likely viral in etiology, recommend supportive care. Increased fluids and rest. Discussed use of nedi-pot, humidifier, and Flonase nasal spray for symptomatic relief. The patient demonstrated a good understanding and agreed with the treatment plan.           "

## 2019-09-14 ENCOUNTER — HOSPITAL ENCOUNTER (EMERGENCY)
Facility: MEDICAL CENTER | Age: 49
End: 2019-09-14
Attending: EMERGENCY MEDICINE
Payer: COMMERCIAL

## 2019-09-14 VITALS
BODY MASS INDEX: 23.48 KG/M2 | DIASTOLIC BLOOD PRESSURE: 76 MMHG | SYSTOLIC BLOOD PRESSURE: 117 MMHG | TEMPERATURE: 97.2 F | HEART RATE: 85 BPM | RESPIRATION RATE: 16 BRPM | OXYGEN SATURATION: 97 % | WEIGHT: 149.91 LBS

## 2019-09-14 DIAGNOSIS — S01.81XA FACIAL LACERATION, INITIAL ENCOUNTER: ICD-10-CM

## 2019-09-14 PROCEDURE — 700101 HCHG RX REV CODE 250: Performed by: EMERGENCY MEDICINE

## 2019-09-14 PROCEDURE — 303485 HCHG DRESSING MEDIUM

## 2019-09-14 PROCEDURE — 304217 HCHG IRRIGATION SYSTEM

## 2019-09-14 PROCEDURE — 99283 EMERGENCY DEPT VISIT LOW MDM: CPT

## 2019-09-14 PROCEDURE — 303747 HCHG EXTRA SUTURE

## 2019-09-14 PROCEDURE — 304999 HCHG REPAIR-SIMPLE/INTERMED LEVEL 1

## 2019-09-14 RX ORDER — LIDOCAINE HYDROCHLORIDE 20 MG/ML
20 INJECTION, SOLUTION INFILTRATION; PERINEURAL ONCE
Status: COMPLETED | OUTPATIENT
Start: 2019-09-14 | End: 2019-09-14

## 2019-09-14 RX ADMIN — LIDOCAINE HYDROCHLORIDE 20 ML: 20 INJECTION, SOLUTION INFILTRATION; PERINEURAL at 08:30

## 2019-09-14 NOTE — ED PROVIDER NOTES
ED Provider Note    CHIEF COMPLAINT   Chief Complaint   Patient presents with   • Head Laceration     tripped and fell on dresser       HPI   Skylar Asia Mendenhall is a 49 y.o. female who presents to the emergency room today with laceration to her forehead.  Patient states around midnight she fell causing injury to the left forehead from hitting the corner in her dresser.  She had no loss of consciousness.  No other injury at this time her tetanus is up-to-date 2017.    REVIEW OF SYSTEMS   See HPI for further details. All other systems are negative.     PAST MEDICAL HISTORY   Past Medical History:   Diagnosis Date   • Renal disorder 2017    kidney stones   • Anesthesia     post op n/v   • Gynecological disorder    • Pain    • Psychiatric problem        FAMILY HISTORY  History reviewed. No pertinent family history.    SOCIAL HISTORY  Social History     Socioeconomic History   • Marital status:      Spouse name: Not on file   • Number of children: Not on file   • Years of education: Not on file   • Highest education level: Not on file   Occupational History   • Not on file   Social Needs   • Financial resource strain: Not on file   • Food insecurity:     Worry: Not on file     Inability: Not on file   • Transportation needs:     Medical: Not on file     Non-medical: Not on file   Tobacco Use   • Smoking status: Current Every Day Smoker     Packs/day: 0.50     Years: 20.00     Pack years: 10.00   • Smokeless tobacco: Never Used   Substance and Sexual Activity   • Alcohol use: Yes     Comment: daily beer   • Drug use: No   • Sexual activity: Not on file   Lifestyle   • Physical activity:     Days per week: Not on file     Minutes per session: Not on file   • Stress: Not on file   Relationships   • Social connections:     Talks on phone: Not on file     Gets together: Not on file     Attends [employer]oration[employer] service: Not on file     Active member of club or organization: Not on file     Attends meetings of clubs or  organizations: Not on file     Relationship status: Not on file   • Intimate partner violence:     Fear of current or ex partner: Not on file     Emotionally abused: Not on file     Physically abused: Not on file     Forced sexual activity: Not on file   Other Topics Concern   • Not on file   Social History Narrative   • Not on file        SURGICAL HISTORY  Past Surgical History:   Procedure Laterality Date   • HYSTEROSCOPY WITH VIDEO DIAGNOSTIC  2/4/2017    Procedure: HYSTEROSCOPY WITH VIDEO DIAGNOSTIC;  Surgeon: Ghulam Kirkland M.D.;  Location: SURGERY [facility];  Service:    • DILATION AND CURETTAGE  2/4/2017    Procedure: DILATION AND CURETTAGE;  Surgeon: Ghulam Kirkland M.D.;  Location: SURGERY [facility];  Service:    • HYSTEROSCOPY THERMAL ABLATION  2/4/2017    Procedure: HYSTEROSCOPY THERMAL ABLATION;  Surgeon: Ghulam Kirkland M.D.;  Location: SURGERY [facility];  Service:    • GYN SURGERY  2012    tubal ligation   • OTHER  2006    Lumpectomy left breast       CURRENT MEDICATIONS   Home Medications     Reviewed by Haydee Mead R.N. (Registered Nurse) on 09/14/19 at 0749  Med List Status: Complete   Medication Last Dose Status   sertraline (ZOLOFT) 100 MG Tab  Active                ALLERGIES   No Known Allergies    PHYSICAL EXAM  VITAL SIGNS: /76   Pulse 85   Temp 36.2 °C (97.2 °F) (Temporal)   Resp 16   Wt 68 kg (149 lb 14.6 oz)   SpO2 97%   BMI 23.48 kg/m²       Constitutional: Well developed, Well nourished,  acute distress, Non-toxic appearance.   Cardiovascular: Normal heart rate, Normal rhythm, No murmurs, No rubs, No gallops.   Thorax & Lungs: Normal breath sounds, No respiratory distress, No wheezing, No chest tenderness.   Skin: 6 cm laceration left forehead that this to the subcutaneous tissue.  Extremities: Intact distal pulses, No edema, No tenderness, No cyanosis, No clubbing.   Neurological exam; no neurological deficits noted on examination    COURSE & MEDICAL  DECISION MAKING  Pertinent Labs & Imaging studies reviewed. (See chart for details) tetanus up-to-date.  Discussed signs and symptoms of infection which include but not limited to redness, swelling, discharge or fever return promptly to the ER.  Apply Neosporin OTC to the wound once or twice a day.  8 nylon sutures out in 5 days.  Patient verbalizes understanding above instructions need for follow-up as described.          PROCEDURE NOTE; repair of 6 cm laceration by ER physician; verbal consent given by patient.  Patient localized anesthetic Xylocaine 2% injected to the site by ER physician total of 3.5 cc.  Area was irrigated with sterile saline solution total of 500 cc.  Using sterile technique and under sterile conditions the wound was debrided.  Multilayer closure was performed using chromic 3×0 for a total of 6 interrupted sutures.  Overlying nylon of a 5×0 nylon thread for additional 8 interrupted sutures.  Bacitracin dressing applied.  Patient tolerated procedure well there was good closure of the wound.    FINAL IMPRESSION  1.  Acute 6 cm laceration left forehead  2.   3.      Electronically signed by: Jesus Manuel Real, 9/14/2019 3:05 PM

## 2019-09-14 NOTE — ED TRIAGE NOTES
Chief Complaint   Patient presents with   • Head Laceration     tripped and fell on dresser     Pt ambulated to triage ,nad , large laceration left forehead area. Per pt report she tripped and fell midnight and fell on dresser, denies loc.

## 2019-09-14 NOTE — ED NOTES
Pt provided with discharge instructions, instructions for follow up appointment in 5 days for suture removal, s/s of when to seek emergency care.  Pt verbalizes understanding.  Pt discharged in good condition.

## 2020-03-11 ENCOUNTER — OFFICE VISIT (OUTPATIENT)
Dept: URGENT CARE | Facility: PHYSICIAN GROUP | Age: 50
End: 2020-03-11
Payer: COMMERCIAL

## 2020-03-11 VITALS
HEIGHT: 67 IN | BODY MASS INDEX: 21.97 KG/M2 | OXYGEN SATURATION: 98 % | SYSTOLIC BLOOD PRESSURE: 130 MMHG | TEMPERATURE: 97.8 F | WEIGHT: 140 LBS | DIASTOLIC BLOOD PRESSURE: 80 MMHG | HEART RATE: 82 BPM | RESPIRATION RATE: 13 BRPM

## 2020-03-11 DIAGNOSIS — J01.00 ACUTE NON-RECURRENT MAXILLARY SINUSITIS: ICD-10-CM

## 2020-03-11 DIAGNOSIS — B35.3 TINEA PEDIS OF BOTH FEET: ICD-10-CM

## 2020-03-11 DIAGNOSIS — L03.115 CELLULITIS OF BOTH FEET: ICD-10-CM

## 2020-03-11 DIAGNOSIS — L03.116 CELLULITIS OF BOTH FEET: ICD-10-CM

## 2020-03-11 PROCEDURE — 99214 OFFICE O/P EST MOD 30 MIN: CPT | Performed by: PHYSICIAN ASSISTANT

## 2020-03-11 RX ORDER — CLOTRIMAZOLE AND BETAMETHASONE DIPROPIONATE 10; .64 MG/G; MG/G
1 CREAM TOPICAL 2 TIMES DAILY
Qty: 1 TUBE | Refills: 1 | Status: SHIPPED | OUTPATIENT
Start: 2020-03-11

## 2020-03-11 RX ORDER — AMOXICILLIN AND CLAVULANATE POTASSIUM 875; 125 MG/1; MG/1
1 TABLET, FILM COATED ORAL 2 TIMES DAILY
Qty: 10 TAB | Refills: 0 | Status: SHIPPED | OUTPATIENT
Start: 2020-03-11 | End: 2020-03-16

## 2020-03-11 ASSESSMENT — ENCOUNTER SYMPTOMS
SORE THROAT: 0
NAUSEA: 0
EYE REDNESS: 0
COUGH: 0
DIARRHEA: 0
SHORTNESS OF BREATH: 0
MUSCULOSKELETAL NEGATIVE: 1
VOMITING: 0
NAIL CHANGES: 0
SINUS PAIN: 1
DIZZINESS: 0
CHILLS: 0
FEVER: 0
ABDOMINAL PAIN: 0
SPUTUM PRODUCTION: 0
EYE DISCHARGE: 0

## 2020-03-11 ASSESSMENT — FIBROSIS 4 INDEX: FIB4 SCORE: 0.97

## 2020-03-11 NOTE — PROGRESS NOTES
"Subjective:      Skylar Mendenhall is a 50 y.o. female who presents with Rash (feet, also sinus pain)            Rash   This is a new problem. The current episode started 1 to 4 weeks ago (1 week). The affected locations include the right foot and left foot. The rash is characterized by peeling and redness. She was exposed to nothing. Associated symptoms include congestion. Pertinent negatives include no cough, diarrhea, fever, joint pain, nail changes, shortness of breath, sore throat or vomiting. Past treatments include nothing. There is no history of allergies, asthma or eczema.     Patient presents to urgent care reporting a 1 week history of a red rash present on both soles of feet. She denies any itching of the site and reports they are \"sore\". No history of the same. She hasn't tried using any OTC medications. She also reports worsening right sided sinus pain/pressure over the past 3 days. No fevers, chills, body aches, cough, ear pain, sore throat, wheezing, or SOB. No recent use of antibiotics.     Review of Systems   Constitutional: Negative for chills and fever.   HENT: Positive for congestion and sinus pain. Negative for ear pain, hearing loss and sore throat.    Eyes: Negative for discharge and redness.   Respiratory: Negative for cough, sputum production and shortness of breath.    Cardiovascular: Negative for chest pain.   Gastrointestinal: Negative for abdominal pain, diarrhea, nausea and vomiting.   Genitourinary: Negative.    Musculoskeletal: Negative.  Negative for joint pain.   Skin: Positive for rash. Negative for nail changes.   Neurological: Negative for dizziness.        Objective:     /80   Pulse 82   Temp 36.6 °C (97.8 °F)   Resp 13   Ht 1.702 m (5' 7\")   Wt 63.5 kg (140 lb)   SpO2 98%   BMI 21.93 kg/m²      Physical Exam  Vitals signs and nursing note reviewed.   Constitutional:       General: She is not in acute distress.     Appearance: Normal appearance. She is " well-developed. She is not diaphoretic.   HENT:      Head: Normocephalic and atraumatic.      Right Ear: Tympanic membrane, ear canal and external ear normal. There is no impacted cerumen.      Left Ear: Tympanic membrane, ear canal and external ear normal. There is no impacted cerumen.      Nose: Mucosal edema, congestion and rhinorrhea present.      Right Sinus: Maxillary sinus tenderness present. No frontal sinus tenderness.      Left Sinus: No maxillary sinus tenderness or frontal sinus tenderness.      Mouth/Throat:      Mouth: Mucous membranes are moist.      Pharynx: No oropharyngeal exudate or posterior oropharyngeal erythema.   Eyes:      General:         Right eye: No discharge.         Left eye: No discharge.      Conjunctiva/sclera: Conjunctivae normal.      Pupils: Pupils are equal, round, and reactive to light.   Neck:      Musculoskeletal: Normal range of motion.   Cardiovascular:      Rate and Rhythm: Normal rate and regular rhythm.      Heart sounds: Normal heart sounds. No murmur.   Pulmonary:      Effort: Pulmonary effort is normal.      Breath sounds: Normal breath sounds. No wheezing or rales.   Musculoskeletal: Normal range of motion.        Feet:    Feet:      Comments: Erythema present on soles of feet bilaterally with superficial skin breakdown. No significant scaling noted. No proximal streaking.   Skin:     General: Skin is warm and dry.   Neurological:      Mental Status: She is alert and oriented to person, place, and time.   Psychiatric:         Behavior: Behavior normal.             PMH:  has a past medical history of Anesthesia, Gynecological disorder, Pain, Psychiatric problem, and Renal disorder (2017).  MEDS:   Current Outpatient Medications:   •  clotrimazole-betamethasone (LOTRISONE) 1-0.05 % Cream, Apply 1 Application to affected area(s) 2 times a day., Disp: 1 Tube, Rfl: 1  •  amoxicillin-clavulanate (AUGMENTIN) 875-125 MG Tab, Take 1 Tab by mouth 2 times a day for 5 days.,  Disp: 10 Tab, Rfl: 0  •  sertraline (ZOLOFT) 100 MG Tab, Take 100 mg by mouth every day., Disp: , Rfl:   ALLERGIES: No Known Allergies  SURGHX:   Past Surgical History:   Procedure Laterality Date   • HYSTEROSCOPY WITH VIDEO DIAGNOSTIC  2/4/2017    Procedure: HYSTEROSCOPY WITH VIDEO DIAGNOSTIC;  Surgeon: Ghulam Kirkland M.D.;  Location: SURGERY [facility];  Service:    • DILATION AND CURETTAGE  2/4/2017    Procedure: DILATION AND CURETTAGE;  Surgeon: Ghulam Kirkland M.D.;  Location: SURGERY [facility];  Service:    • HYSTEROSCOPY THERMAL ABLATION  2/4/2017    Procedure: HYSTEROSCOPY THERMAL ABLATION;  Surgeon: Ghulam Kirkland M.D.;  Location: SURGERY [facility];  Service:    • GYN SURGERY  2012    tubal ligation   • OTHER  2006    Lumpectomy left breast     SOCHX:  reports that she has been smoking. She has a 10.00 pack-year smoking history. She has never used smokeless tobacco. She reports current alcohol use. She reports that she does not use drugs.  FH: family history is not on file.    Assessment/Plan:       1. Tinea pedis of both feet    - clotrimazole-betamethasone (LOTRISONE) 1-0.05 % Cream; Apply 1 Application to affected area(s) 2 times a day.  Dispense: 1 Tube; Refill: 1    2. Cellulitis of both feet    - amoxicillin-clavulanate (AUGMENTIN) 875-125 MG Tab; Take 1 Tab by mouth 2 times a day for 5 days.  Dispense: 10 Tab; Refill: 0   - Complete full course of antibiotics as prescribed     Advised patient the rash of both feet has evidence of both bacterial and yeast involvement. Augmentin provided to cover for cellulitis. Lotrisone 2-3 times daily. Advised to keep the area cool, clean, and dry. Avoid use of close-toed shoes. Monitor closely and RTC or follow up with primary care if symptoms persist/worsen. The patient demonstrated a good understanding and agreed with the treatment plan.       3. Acute non-recurrent maxillary sinusitis    Advised patient symptoms are most likely viral in  etiology, recommend supportive care. Increased fluids and rest. Discussed use of nedi-pot, humidifier, and Flonase nasal spray for symptomatic relief. Call or return to office if symptoms persist or worsen. The patient demonstrated a good understanding and agreed with the treatment plan.

## 2020-03-11 NOTE — LETTER
March 11, 2020         Patient: Skylar Mendenhall   YOB: 1970   Date of Visit: 3/11/2020           To Whom it May Concern:    Skylar Mendenhall was seen in my clinic on 3/11/2020.     If you have any questions or concerns, please don't hesitate to call.        Sincerely,           Mikayla Childress P.A.-C.  Electronically Signed

## (undated) DEVICE — LACTATED RINGERS INJ 1000 ML - (14EA/CA 60CA/PF)

## (undated) DEVICE — CANISTER SUCTION 3000ML MECHANICAL FILTER AUTO SHUTOFF MEDI-VAC NONSTERILE LF DISP  (40EA/CA)

## (undated) DEVICE — TUBING D & E COLLECTION SET (50EA/PK)

## (undated) DEVICE — NEPTUNE 4 PORT MANIFOLD - (20/PK)

## (undated) DEVICE — WATER IRRIG. STER. 1500 ML - (9/CA)

## (undated) DEVICE — HEAD HOLDER JUNIOR/ADULT

## (undated) DEVICE — ELECTRODE 850 FOAM ADHESIVE - HYDROGEL RADIOTRNSPRNT (50/PK)

## (undated) DEVICE — KIT ANESTHESIA W/CIRCUIT & 3/LT BAG W/FILTER (20EA/CA)

## (undated) DEVICE — TRAY SRGPRP PVP IOD WT PRP - (20/CA)

## (undated) DEVICE — POUCH FLUID COLLECTION INVISISHIELD - (10/BX)

## (undated) DEVICE — SET LEADWIRE 5 LEAD BEDSIDE DISPOSABLE ECG (1SET OF 5/EA)

## (undated) DEVICE — Device

## (undated) DEVICE — SET EXTENSION WITH 2 PORTS (48EA/CA) ***PART #2C8610 IS A SUBSTITUTE*****

## (undated) DEVICE — DRAPE MAYO STAND - (30/CA)

## (undated) DEVICE — MASK ANESTHESIA ADULT  - (100/CA)

## (undated) DEVICE — SODIUM CHL IRRIGATION 0.9% 1000ML (12EA/CA)

## (undated) DEVICE — BRIEF STRETCH MATERNITY M/L - FITS 20-60IN (5EA/BG 20BG/CA)

## (undated) DEVICE — SLEEVE, VASO, THIGH, MED

## (undated) DEVICE — KIT D & C COLLECTION (10EA/PK)

## (undated) DEVICE — DRAPE UNDER BUTTOCKS FLUID - (20/CA)

## (undated) DEVICE — SOLUTION SORBITOL 3000ML (4/CA)

## (undated) DEVICE — GLOVE BIOGEL SZ 7.5 SURGICAL PF LTX - (50PR/BX 4BX/CA)

## (undated) DEVICE — PROTECTOR ULNA NERVE - (36PR/CA)

## (undated) DEVICE — SODIUM CHL. IRRIGATION 0.9% 3000ML (4EA/CA 65CA/PF)

## (undated) DEVICE — GOWN WARMING STANDARD FLEX - (30/CA)

## (undated) DEVICE — KIT HTA GENESYS - (5/BX)

## (undated) DEVICE — SENSOR SPO2 NEO LNCS ADHESIVE (20/BX) SEE USER NOTES

## (undated) DEVICE — DRESSING NON ADHERENT 3 X 4 - STERILE (100/BX 12BX/CA)

## (undated) DEVICE — TUBING OUTFLOW HYSTEROSCPY (10EA/BX)

## (undated) DEVICE — TUBING INFLOW HYSTEROSCOPY (10EA/CA)

## (undated) DEVICE — TUBING CLEARLINK DUO-VENT - C-FLO (48EA/CA)

## (undated) DEVICE — KIT ROOM DECONTAMINATION

## (undated) DEVICE — DETERGENT RENUZYME PLUS 10 OZ PACKET (50/BX)

## (undated) DEVICE — SUCTION INSTRUMENT YANKAUER BULBOUS TIP W/O VENT (50EA/CA)

## (undated) DEVICE — JELLY SURGILUBE STERILE TUBE 4.25 OZ (1/EA)

## (undated) DEVICE — PAD SANITARY 11IN MAXI IND WRAPPED  (12EA/PK 24PK/CA)

## (undated) DEVICE — SUTURE GENERAL